# Patient Record
Sex: MALE | Race: WHITE | HISPANIC OR LATINO
[De-identification: names, ages, dates, MRNs, and addresses within clinical notes are randomized per-mention and may not be internally consistent; named-entity substitution may affect disease eponyms.]

---

## 2017-10-26 ENCOUNTER — RECORD ABSTRACTING (OUTPATIENT)
Age: 62
End: 2017-10-26

## 2017-10-26 ENCOUNTER — APPOINTMENT (OUTPATIENT)
Dept: CARDIOLOGY | Facility: CLINIC | Age: 62
End: 2017-10-26
Payer: COMMERCIAL

## 2017-10-26 VITALS — HEIGHT: 65 IN | BODY MASS INDEX: 30.99 KG/M2 | OXYGEN SATURATION: 98 % | HEART RATE: 71 BPM | WEIGHT: 186 LBS

## 2017-10-26 VITALS — DIASTOLIC BLOOD PRESSURE: 60 MMHG | SYSTOLIC BLOOD PRESSURE: 114 MMHG

## 2017-10-26 DIAGNOSIS — Z86.39 PERSONAL HISTORY OF OTHER ENDOCRINE, NUTRITIONAL AND METABOLIC DISEASE: ICD-10-CM

## 2017-10-26 DIAGNOSIS — Z86.79 PERSONAL HISTORY OF OTHER DISEASES OF THE CIRCULATORY SYSTEM: ICD-10-CM

## 2017-10-26 DIAGNOSIS — Z95.5 PRESENCE OF CORONARY ANGIOPLASTY IMPLANT AND GRAFT: ICD-10-CM

## 2017-10-26 DIAGNOSIS — R07.89 OTHER CHEST PAIN: ICD-10-CM

## 2017-10-26 DIAGNOSIS — I25.2 OLD MYOCARDIAL INFARCTION: ICD-10-CM

## 2017-10-26 DIAGNOSIS — Z87.891 PERSONAL HISTORY OF NICOTINE DEPENDENCE: ICD-10-CM

## 2017-10-26 PROCEDURE — 93000 ELECTROCARDIOGRAM COMPLETE: CPT

## 2017-10-26 PROCEDURE — 99204 OFFICE O/P NEW MOD 45 MIN: CPT | Mod: 25

## 2017-10-31 PROBLEM — Z95.5 HISTORY OF CORONARY ARTERY STENT PLACEMENT: Status: RESOLVED | Noted: 2017-10-26 | Resolved: 2017-10-31

## 2017-10-31 PROBLEM — Z87.891 FORMER SMOKER: Status: ACTIVE | Noted: 2017-10-26

## 2017-10-31 PROBLEM — I25.2 HISTORY OF HEART ATTACK: Status: RESOLVED | Noted: 2017-10-26 | Resolved: 2017-10-31

## 2017-10-31 PROBLEM — Z86.79 HISTORY OF HYPERTENSIVE HEART DISEASE: Status: RESOLVED | Noted: 2017-10-26 | Resolved: 2017-10-31

## 2017-10-31 PROBLEM — R07.89 ATYPICAL CHEST PAIN: Status: ACTIVE | Noted: 2017-10-26

## 2017-10-31 PROBLEM — Z86.39 HISTORY OF HYPERLIPIDEMIA: Status: RESOLVED | Noted: 2017-10-26 | Resolved: 2017-10-31

## 2017-10-31 PROBLEM — Z86.79 HISTORY OF HYPERTENSION: Status: RESOLVED | Noted: 2017-10-26 | Resolved: 2017-10-31

## 2017-10-31 PROBLEM — Z86.79 HISTORY OF CORONARY ARTERY DISEASE: Status: RESOLVED | Noted: 2017-10-26 | Resolved: 2017-10-31

## 2017-10-31 RX ORDER — ASPIRIN 81 MG/1
81 TABLET, CHEWABLE ORAL DAILY
Refills: 0 | Status: ACTIVE | COMMUNITY

## 2017-11-29 ENCOUNTER — APPOINTMENT (OUTPATIENT)
Dept: CARDIOLOGY | Facility: CLINIC | Age: 62
End: 2017-11-29

## 2017-12-05 ENCOUNTER — MEDICATION RENEWAL (OUTPATIENT)
Age: 62
End: 2017-12-05

## 2020-02-13 ENCOUNTER — APPOINTMENT (OUTPATIENT)
Dept: CARDIOLOGY | Facility: CLINIC | Age: 65
End: 2020-02-13

## 2021-06-28 DIAGNOSIS — M25.512 PAIN IN LEFT SHOULDER: ICD-10-CM

## 2021-06-28 DIAGNOSIS — Z83.3 FAMILY HISTORY OF DIABETES MELLITUS: ICD-10-CM

## 2021-06-28 DIAGNOSIS — R35.1 NOCTURIA: ICD-10-CM

## 2021-06-28 DIAGNOSIS — Z87.19 PERSONAL HISTORY OF OTHER DISEASES OF THE DIGESTIVE SYSTEM: ICD-10-CM

## 2021-06-28 RX ORDER — CHOLECALCIFEROL (VITAMIN D3) 1250 MCG
1.25 MG CAPSULE ORAL WEEKLY
Refills: 0 | Status: ACTIVE | COMMUNITY

## 2021-06-28 RX ORDER — LANCETS 33 GAUGE
EACH MISCELLANEOUS
Refills: 0 | Status: ACTIVE | COMMUNITY

## 2021-06-28 RX ORDER — BLOOD-GLUCOSE METER
W/DEVICE EACH MISCELLANEOUS
Refills: 0 | Status: ACTIVE | COMMUNITY

## 2021-07-12 ENCOUNTER — APPOINTMENT (OUTPATIENT)
Dept: INTERNAL MEDICINE | Facility: CLINIC | Age: 66
End: 2021-07-12
Payer: COMMERCIAL

## 2021-07-12 VITALS
OXYGEN SATURATION: 98 % | BODY MASS INDEX: 31.16 KG/M2 | WEIGHT: 187 LBS | RESPIRATION RATE: 18 BRPM | HEIGHT: 65 IN | HEART RATE: 83 BPM | TEMPERATURE: 96 F | SYSTOLIC BLOOD PRESSURE: 118 MMHG | DIASTOLIC BLOOD PRESSURE: 80 MMHG

## 2021-07-12 DIAGNOSIS — I25.2 OLD MYOCARDIAL INFARCTION: ICD-10-CM

## 2021-07-12 LAB
GLUCOSE BLDC GLUCOMTR-MCNC: 141
HBA1C MFR BLD HPLC: 6.5

## 2021-07-12 PROCEDURE — 99072 ADDL SUPL MATRL&STAF TM PHE: CPT

## 2021-07-12 PROCEDURE — 82962 GLUCOSE BLOOD TEST: CPT

## 2021-07-12 PROCEDURE — 83036 HEMOGLOBIN GLYCOSYLATED A1C: CPT | Mod: NC,QW

## 2021-07-12 PROCEDURE — 99214 OFFICE O/P EST MOD 30 MIN: CPT | Mod: 25

## 2021-07-13 NOTE — HISTORY OF PRESENT ILLNESS
[FreeTextEntry1] : Follow up, GHM, blood work, hx of diabetes, CAD, obesity, MI, stents, vit d def [de-identified] :  66 y/o M presents for f/u, GHM. Hx of diabetes, htn, CAD, Obesity, MI, stents and vit d def. \par He denies fever, cough, body aches, chills and SOB. Of note, received both doses of the pfizer vaccine.\par Pt is still working as a .\par \par Has not had eye eval thus far this year. \par Pt is here for [employer] and routine bood work.\par He denies chest pain/recent falls.\par Voices no further complaints.

## 2021-07-13 NOTE — COUNSELING
[Fall prevention counseling provided] : Fall prevention counseling provided [Adequate lighting] : Adequate lighting [No throw rugs] : No throw rugs [Use proper foot wear] : Use proper foot wear [Use recommended devices] : Use recommended devices [Behavioral health counseling provided] : Behavioral health counseling provided [Sleep ___ hours/day] : Sleep [unfilled] hours/day [Plan in advance] : Plan in advance [Potential consequences of obesity discussed] : Potential consequences of obesity discussed [Benefits of weight loss discussed] : Benefits of weight loss discussed [Structured Weight Management Program suggested:] : Structured weight management program suggested [Encouraged to maintain food diary] : Encouraged to maintain food diary [Encouraged to increase physical activity] : Encouraged to increase physical activity [Encouraged to use exercise tracking device] : Encouraged to use exercise tracking device [Weigh Self Weekly] : weigh self weekly [Keep Food Diary] : keep food diary [de-identified] : Discussed diabetic diet, told to cut simple carbs cut fats eat lean foods fish. [None] : None [Good understanding] : Patient has a good understanding of lifestyle changes and steps needed to achieve self management goal

## 2021-07-18 ENCOUNTER — RX RENEWAL (OUTPATIENT)
Age: 66
End: 2021-07-18

## 2021-07-27 ENCOUNTER — RX RENEWAL (OUTPATIENT)
Age: 66
End: 2021-07-27

## 2021-09-03 ENCOUNTER — RX RENEWAL (OUTPATIENT)
Age: 66
End: 2021-09-03

## 2021-12-13 ENCOUNTER — NON-APPOINTMENT (OUTPATIENT)
Age: 66
End: 2021-12-13

## 2021-12-13 ENCOUNTER — APPOINTMENT (OUTPATIENT)
Dept: INTERNAL MEDICINE | Facility: CLINIC | Age: 66
End: 2021-12-13
Payer: MEDICARE

## 2021-12-13 VITALS
TEMPERATURE: 97.8 F | HEART RATE: 84 BPM | WEIGHT: 187 LBS | RESPIRATION RATE: 17 BRPM | OXYGEN SATURATION: 98 % | DIASTOLIC BLOOD PRESSURE: 68 MMHG | BODY MASS INDEX: 31.16 KG/M2 | HEIGHT: 65 IN | SYSTOLIC BLOOD PRESSURE: 108 MMHG

## 2021-12-13 DIAGNOSIS — M54.50 LOW BACK PAIN, UNSPECIFIED: ICD-10-CM

## 2021-12-13 PROCEDURE — G0008: CPT

## 2021-12-13 PROCEDURE — G0447 BEHAVIOR COUNSEL OBESITY 15M: CPT

## 2021-12-13 PROCEDURE — G0442 ANNUAL ALCOHOL SCREEN 15 MIN: CPT

## 2021-12-13 PROCEDURE — 93000 ELECTROCARDIOGRAM COMPLETE: CPT | Mod: 59

## 2021-12-13 PROCEDURE — 99214 OFFICE O/P EST MOD 30 MIN: CPT | Mod: 25

## 2021-12-13 PROCEDURE — 90662 IIV NO PRSV INCREASED AG IM: CPT

## 2021-12-13 PROCEDURE — G0439: CPT

## 2021-12-13 PROCEDURE — G0444 DEPRESSION SCREEN ANNUAL: CPT | Mod: 59

## 2021-12-14 ENCOUNTER — NON-APPOINTMENT (OUTPATIENT)
Age: 66
End: 2021-12-14

## 2021-12-14 PROBLEM — M54.50 LOWER BACK PAIN: Status: ACTIVE | Noted: 2021-12-14

## 2021-12-14 LAB
25(OH)D3 SERPL-MCNC: 26.9 NG/ML
ALBUMIN SERPL ELPH-MCNC: 4.8 G/DL
ALP BLD-CCNC: 125 U/L
ALT SERPL-CCNC: 34 U/L
ANION GAP SERPL CALC-SCNC: 11 MMOL/L
APPEARANCE: CLEAR
AST SERPL-CCNC: 22 U/L
BASOPHILS # BLD AUTO: 0.06 K/UL
BASOPHILS NFR BLD AUTO: 0.9 %
BILIRUB SERPL-MCNC: 0.4 MG/DL
BILIRUBIN URINE: NEGATIVE
BLOOD URINE: NEGATIVE
BUN SERPL-MCNC: 22 MG/DL
CALCIUM SERPL-MCNC: 9.8 MG/DL
CHLORIDE SERPL-SCNC: 104 MMOL/L
CHOLEST SERPL-MCNC: 152 MG/DL
CO2 SERPL-SCNC: 23 MMOL/L
COLOR: NORMAL
CREAT SERPL-MCNC: 1.05 MG/DL
EOSINOPHIL # BLD AUTO: 0.15 K/UL
EOSINOPHIL NFR BLD AUTO: 2.3 %
ESTIMATED AVERAGE GLUCOSE: 148 MG/DL
GGT SERPL-CCNC: 35 U/L
GLUCOSE QUALITATIVE U: NEGATIVE
GLUCOSE SERPL-MCNC: 125 MG/DL
HBA1C MFR BLD HPLC: 6.8 %
HCT VFR BLD CALC: 45.2 %
HDLC SERPL-MCNC: 46 MG/DL
HGB BLD-MCNC: 14.6 G/DL
IMM GRANULOCYTES NFR BLD AUTO: 0.2 %
KETONES URINE: NEGATIVE
LDLC SERPL CALC-MCNC: 66 MG/DL
LEUKOCYTE ESTERASE URINE: NEGATIVE
LYMPHOCYTES # BLD AUTO: 2.15 K/UL
LYMPHOCYTES NFR BLD AUTO: 32.9 %
MAN DIFF?: NORMAL
MCHC RBC-ENTMCNC: 30.4 PG
MCHC RBC-ENTMCNC: 32.3 GM/DL
MCV RBC AUTO: 94.2 FL
MONOCYTES # BLD AUTO: 0.42 K/UL
MONOCYTES NFR BLD AUTO: 6.4 %
NEUTROPHILS # BLD AUTO: 3.74 K/UL
NEUTROPHILS NFR BLD AUTO: 57.3 %
NITRITE URINE: NEGATIVE
NONHDLC SERPL-MCNC: 105 MG/DL
PH URINE: 6
PLATELET # BLD AUTO: 174 K/UL
POTASSIUM SERPL-SCNC: 5.3 MMOL/L
PROT SERPL-MCNC: 7.1 G/DL
PROTEIN URINE: NEGATIVE
PSA SERPL-MCNC: 0.36 NG/ML
RBC # BLD: 4.8 M/UL
RBC # FLD: 12.8 %
SODIUM SERPL-SCNC: 138 MMOL/L
SPECIFIC GRAVITY URINE: 1.02
TRIGL SERPL-MCNC: 197 MG/DL
TSH SERPL-ACNC: 2.83 UIU/ML
UROBILINOGEN URINE: NORMAL
WBC # FLD AUTO: 6.53 K/UL

## 2021-12-14 NOTE — COUNSELING
[Fall prevention counseling provided] : Fall prevention counseling provided [Adequate lighting] : Adequate lighting [No throw rugs] : No throw rugs [Use proper foot wear] : Use proper foot wear [Use recommended devices] : Use recommended devices [Behavioral health counseling provided] : Behavioral health counseling provided [Sleep ___ hours/day] : Sleep [unfilled] hours/day [Engage in a relaxing activity] : Engage in a relaxing activity [Plan in advance] : Plan in advance [Potential consequences of obesity discussed] : Potential consequences of obesity discussed [Benefits of weight loss discussed] : Benefits of weight loss discussed [Structured Weight Management Program suggested:] : Structured weight management program suggested [Encouraged to maintain food diary] : Encouraged to maintain food diary [Encouraged to increase physical activity] : Encouraged to increase physical activity [Target Wt Loss Goal ___] : Weight Loss Goals: Target weight loss goal [unfilled] lbs [Weigh Self Weekly] : weigh self weekly [Decrease Portions] : decrease portions [____ min/wk Activity] : [unfilled] min/wk activity [None] : None [Good understanding] : Patient has a good understanding of lifestyle changes and steps needed to achieve self management goal [FreeTextEntry4] : 10 [de-identified] : Medical Annual wellness visit completed:\par HRA completed and reviewed with patient\par Medical, family, surgical history reviewed with patient and updated\par List of current providers r/w patient and updated\par Vitals, BMI reviewed and discussed along with healthy BMI goals. Dietary counseling x 15 minutes provided\par Depression PHQ 9 completed and reviewed \par Annual safety assessment reviewed\par discussed advanced directives\par smoking cessation counseling provided\par Established routine screening and immunization schedules\par \par VACCINATION & OTHER TX RECOMMENDATIONS\par \par ASA preventative therapy\par Calcium/Vitamin D supplementation\par  \par Dietary counseling, nutrition referral\par risks vs. benefits d/w patient. routine vaccination and vaccination schedules and recommendation d/w patient\par \par Vaccines recommended: \par * pneumovax (once after 65) & prevnar\par * annual Influenza vaccine\par * Hep B vaccines\par * zostavax\par * Tdap\par \par Colorectal screening recommended; screening colonoscopy q10yr, flex sig q5yr, annual fecal occult testing\par BMD recommended biennially for osteoporosis screening\par Glaucoma screening recommended, annual optho evals\par Cardiovascular screening and blood tests recommended and discussed w/ patient, cholesterol screening and dietary counseling\par AAA recommended x 1\par \par Met with SHARON CARMICHAEL, who was willing to discuss advance care planning. \par Our advance care planning conversation included a discussion about:\par 1. The value and importance of advance care planning.\par 2. Experiences with loved ones who have been seriously ill or have .\par 3. Exploration of personal, cultural, or spiritual beliefs that might influence medical decisions.\par 4. Exploration of goals of care in the event of a sudden injury or illness.\par 5. Identification of a health care agent.\par 6. Review and update, or completion of, an advance directive.\par Start time: ___1000am_________                    End time: ___1015am_________\par \par diet and exercise weight loss.  Low-salt low-fat ADA diet/ htn- Discussed diabetes physiology\par - Discussed importance of monitoring blood glucose levels\par - Encouraged a low fat/low cholesterol diet\par - Discussed symptoms of hyperglycemia and hypoglycemia\par - Discussed ADA glucose goals\par - Discussed  HGB A1c and the effects of blood glucose on the level\par - Discussed Healthy eating, avoidance of concentrated sweets, and to include vegetables by at least 2 meals a day\par - Discussed regular exercise\par - Discussed importance of follow up physician visits Limit intake of Sodium (Salt) to less than 2 grams a day to prevent fluid retention-swelling or worsening of symptoms. The importance of keeping the blood pressure at or below 130/80 to prevent stroke, heart attacks, kidney failure, blindness, and loss of limbs was  low chol diet. Avoid fried foods, red meat, butter, eggs, hard cheeses. Use canola or olive oil preferred. ::  was established in which goals would be set, monitoring would be done, and problem solving would also be addressed. The patient would be assisted using behavior change techniques, such as self-help and counseling through behavioral modification: Problem solving using hypnosis and positive medical reinforcement to achieve agreed-upon goals.\par \par Symptomatic patients : Test for influenza, if positive, treat for influenza and do not continue below. \par 1. Fever plus cough or shortness of breath : Test for RVP and COVID-19.\par 2.Indirect, circumstantial or unclear exposure to COVID-19, or other concerning cases not meeting above criteria: Please call [facility] to discuss testing. \par +++ All above cases must be reported to the [facility] registry. +++\par \par Asymptomatic patients: \par 1. Known first-degree direct-contact exposure to positive COVID-19 patient but asymptomatic: No testing PLUS 14 day self-quarantine. Pt to call if symptoms develop. [facility] to [facility].\par 2. No known exposure and asymptomatic, referred from outside healthcare organization: Please call [facility] to discuss testing. \par 3.All other asymptomatic patients with no known exposures: no testing, no exceptions.\par \par \par

## 2021-12-14 NOTE — REVIEW OF SYSTEMS
[Negative] : Heme/Lymph [Joint Pain] : joint pain [Back Pain] : back pain [FreeTextEntry9] : Lower back pain and left knee pain /left knee pain

## 2021-12-14 NOTE — HISTORY OF PRESENT ILLNESS
[FreeTextEntry1] : F/U,GHM and an Annual Wellness. Hx of diabetes,htn,CAD,obesity,MI,stents and vit d def. chief complaint today is low back pain and knee pain. [de-identified] : 67 y/o M presents for f/u, GHM.\par Hx of diabetes, htn, CAD, Obesity, MI, stents and vit d def. \par \par Patient is here for routine blood work and an EKG.\par Patient has been having left knee pain and lower back pain its gone now.\par Of note, received both doses of the Pfizer vaccine.\par Otherwise patient is stable. \par \par Voices no further complaints.\par ROS as documented below.\par Denies fever,cough,chills,body aches and SOB.

## 2021-12-14 NOTE — HEALTH RISK ASSESSMENT
[Former] : Former [1 or 2 (0 pts)] : 1 or 2 (0 points) [Never (0 pts)] : Never (0 points) [No] : In the past 12 months have you used drugs other than those required for medical reasons? No [No falls in past year] : Patient reported no falls in the past year [0] : 2) Feeling down, depressed, or hopeless: Not at all (0) [PHQ-2 Negative - No further assessment needed] : PHQ-2 Negative - No further assessment needed [I have developed a follow-up plan documented below in the note.] : I have developed a follow-up plan documented below in the note. [None] : None [Feels Safe at Home] : Feels safe at home [Fully functional (bathing, dressing, toileting, transferring, walking, feeding)] : Fully functional (bathing, dressing, toileting, transferring, walking, feeding) [Fully functional (using the telephone, shopping, preparing meals, housekeeping, doing laundry, using] : Fully functional and needs no help or supervision to perform IADLs (using the telephone, shopping, preparing meals, housekeeping, doing laundry, using transportation, managing medications and managing finances) [Fair] :  ~his/her~ mood as fair [With Significant Other] : lives with significant other [Employed] : employed [] :  [Sexually Active] : sexually active [Carbon Monoxide Detector] : carbon monoxide detector [Safety elements used in home] : safety elements used in home [Seat Belt] :  uses seat belt [Sunscreen] : uses sunscreen [Reviewed no changes] : Reviewed, no changes [Designated Healthcare Proxy] : Designated healthcare proxy [Relationship: ___] : Relationship: [unfilled] [Aggressive treatment] : aggressive treatment [Last Verification Date: ___] : Last Verification Date: [unfilled] [Time Spent: ___ minutes] : Time Spent: [unfilled] minutes [Yes] : Yes [2 - 4 times a month (2 pts)] : 2-4 times a month (2 points) [YearQuit] : 1990 [Audit-CScore] : 0 [de-identified] : Light [de-identified] : Standard [CAD2Folxg] : 0 [Change in mental status noted] : No change in mental status noted [Language] : denies difficulty with language [Behavior] : denies difficulty with behavior [Learning/Retaining New Information] : denies difficulty learning/retaining new information [Handling Complex Tasks] : denies difficulty handling complex tasks [Reasoning] : denies difficulty with reasoning [Spatial Ability and Orientation] : denies difficulty with spatial ability and orientation [High Risk Behavior] : no high risk behavior [Reports changes in hearing] : Reports no changes in hearing [Reports changes in vision] : Reports no changes in vision [Reports normal functional visual acuity (ie: able to read med bottle)] : Reports poor functional visual acuity.  [Reports changes in dental health] : Reports no changes in dental health [Guns at Home] : no guns at home [Travel to Developing Areas] : does not  travel to developing areas [BoneDensityComments] : NOT UTD  [ColonoscopyComments] : NOT UTD [FreeTextEntry2] :

## 2022-01-05 ENCOUNTER — APPOINTMENT (OUTPATIENT)
Dept: INTERNAL MEDICINE | Facility: CLINIC | Age: 67
End: 2022-01-05
Payer: MEDICARE

## 2022-01-05 VITALS
OXYGEN SATURATION: 99 % | WEIGHT: 189 LBS | TEMPERATURE: 95.8 F | DIASTOLIC BLOOD PRESSURE: 76 MMHG | SYSTOLIC BLOOD PRESSURE: 126 MMHG | RESPIRATION RATE: 17 BRPM | HEART RATE: 86 BPM | HEIGHT: 65 IN | BODY MASS INDEX: 31.49 KG/M2

## 2022-01-05 DIAGNOSIS — Z20.822 CONTACT WITH AND (SUSPECTED) EXPOSURE TO COVID-19: ICD-10-CM

## 2022-01-05 PROCEDURE — 99214 OFFICE O/P EST MOD 30 MIN: CPT

## 2022-01-06 PROBLEM — Z20.822 ENCOUNTER FOR LABORATORY TESTING FOR COVID-19 VIRUS: Status: ACTIVE | Noted: 2022-01-06

## 2022-01-06 PROBLEM — Z20.822 CLOSE EXPOSURE TO COVID-19 VIRUS: Status: ACTIVE | Noted: 2022-01-06

## 2022-01-06 NOTE — ADDENDUM
[FreeTextEntry1] : Symptomatic patients : Test for influenza, if positive, treat for influenza and do not continue below. \par 1. Fever plus cough or shortness of breath : Test for RVP and COVID-19.\par 2.Indirect, circumstantial or unclear exposure to COVID-19, or other concerning cases not meeting above criteria: Please call [facility] to discuss testing. \par +++ All above cases must be reported to the [facility] registry. +++\par \par Asymptomatic patients: \par 1. Known first-degree direct-contact exposure to positive COVID-19 patient but asymptomatic: No testing PLUS 14 day self-quarantine. Pt to call if symptoms develop. Report to [facility] Registry.\par 2. No known exposure and asymptomatic, referred from outside healthcare organization: Please call [facility] to discuss testing. \par 3.All other asymptomatic patients with no known exposures: no testing, no exceptions.\par \par - Discussed diabetes physiology\par - Discussed importance of monitoring blood glucose levels\par - Encouraged a low fat/low cholesterol diet\par - Discussed symptoms of hyperglycemia and hypoglycemia\par - Discussed ADA glucose goals\par - Discussed  HGB A1c and the effects of blood glucose on the level\par - Discussed Healthy eating, avoidance of concentrated sweets, and to include vegetables by at least 2 meals a day\par - Discussed regular exercise\par - Discussed importance of follow up physician visits\par \par \par \par Bp stable, continue with medications, dash diet, exercise, and dietary management.Continue to check home Bp’s.\par \par diet and exercise weight loss.  Low-salt low-fat ADA diet/ htn- Discussed diabetes physiology\par - Discussed importance of monitoring blood glucose levels\par - Encouraged a low fat/low cholesterol diet\par - Discussed symptoms of hyperglycemia and hypoglycemia\par - Discussed ADA glucose goals\par - Discussed  HGB A1c and the effects of blood glucose on the level\par - Discussed Healthy eating, avoidance of concentrated sweets, and to include vegetables by at least 2 meals a day\par - Discussed regular exercise\par - Discussed importance of follow up physician visits Limit intake of Sodium (Salt) to less than 2 grams a day to prevent fluid retention-swelling or worsening of symptoms. The importance of keeping the blood pressure at or below 130/80 to prevent stroke, heart attacks, kidney failure, blindness, and loss of limbs was  low chol diet. Avoid fried foods, red meat, butter, eggs, hard cheeses. Use canola or olive oil preferred. ::  was established in which goals would be set, monitoring would be done, and problem solving would also be addressed. The patient would be assisted using behavior change techniques, such as self-help and counseling through behavioral modification: Problem solving using hypnosis and positive medical reinforcement to achieve agreed-upon goals.\par

## 2022-01-06 NOTE — HISTORY OF PRESENT ILLNESS
[Patient presents to the office today for COVID-19 evaluation and testing.] : Patient presents to the office today for COVID-19 evaluation and testing. [] : no dizziness on standing [With Suspected Case] : patient exposed to a suspected case of COVID-19 [Age >= 60 years] : age >= 60 years [None] : none [Clear] : clear [Good Air Entry] : good air entry [Normal O2 sat at rest] : normal O2 sat at rest [Grossly normal, interacts, not tired or weak] : grossly normal, interacts, not tired or weak [COVID-19 testing ordered and specimen obtained] : COVID-19 testing ordered and specimen obtained [FreeTextEntry1] : Patient developed nasal congestion cough and sore throat with muscle aches.  General health maintenance for CAD diabetes hypertension

## 2022-01-07 ENCOUNTER — NON-APPOINTMENT (OUTPATIENT)
Age: 67
End: 2022-01-07

## 2022-01-07 LAB
RAPID RVP RESULT: DETECTED
SARS-COV-2 RNA PNL RESP NAA+PROBE: DETECTED

## 2022-01-07 NOTE — HISTORY OF PRESENT ILLNESS
[Home] : at home, [unfilled] , at the time of the visit. [FreeTextEntry1] : Patient made aware that he is COVID positive= patient states that his symptoms of head cold are resolving patient will quarantine for 10 days.\par \par Symptomatic patients : Test for influenza, if positive, treat for influenza and do not continue below. \par 1. Fever plus cough or shortness of breath : Test for RVP and COVID-19.\par 2.Indirect, circumstantial or unclear exposure to COVID-19, or other concerning cases not meeting above criteria: Please call [facility] to discuss testing. \par +++ All above cases must be reported to the [facility] registry. +++\par \par Asymptomatic patients: \par 1. Known first-degree direct-contact exposure to positive COVID-19 patient but asymptomatic: No testing PLUS 14 day self-quarantine. Pt to call if symptoms develop. Report to [facility] Registry.\par 2. No known exposure and asymptomatic, referred from outside healthcare organization: Please call [facility] to discuss testing. \par 3.All other asymptomatic patients with no known exposures: no testing, no exceptions.\par \par

## 2022-02-14 ENCOUNTER — NON-APPOINTMENT (OUTPATIENT)
Age: 67
End: 2022-02-14

## 2022-02-14 ENCOUNTER — APPOINTMENT (OUTPATIENT)
Dept: CARDIOLOGY | Facility: CLINIC | Age: 67
End: 2022-02-14
Payer: MEDICARE

## 2022-02-14 VITALS
TEMPERATURE: 97 F | OXYGEN SATURATION: 98 % | HEIGHT: 65 IN | HEART RATE: 76 BPM | BODY MASS INDEX: 31.65 KG/M2 | WEIGHT: 190 LBS

## 2022-02-14 VITALS — SYSTOLIC BLOOD PRESSURE: 116 MMHG | DIASTOLIC BLOOD PRESSURE: 70 MMHG

## 2022-02-14 PROCEDURE — 93000 ELECTROCARDIOGRAM COMPLETE: CPT

## 2022-02-14 PROCEDURE — 99205 OFFICE O/P NEW HI 60 MIN: CPT

## 2022-02-14 RX ORDER — ZOLPIDEM TARTRATE 5 MG/1
5 TABLET ORAL
Qty: 30 | Refills: 2 | Status: DISCONTINUED | COMMUNITY
End: 2022-02-14

## 2022-02-14 RX ORDER — DICLOFENAC SODIUM 1% 10 MG/G
1 GEL TOPICAL
Qty: 100 | Refills: 3 | Status: DISCONTINUED | COMMUNITY
Start: 2021-12-13 | End: 2022-02-14

## 2022-02-14 RX ORDER — FOSINOPRIL SODIUM 10 MG/1
10 TABLET ORAL DAILY
Refills: 0 | Status: DISCONTINUED | COMMUNITY
End: 2022-02-14

## 2022-02-14 RX ORDER — BENZONATATE 100 MG/1
100 CAPSULE ORAL EVERY 6 HOURS
Qty: 30 | Refills: 0 | Status: DISCONTINUED | COMMUNITY
Start: 2022-01-05 | End: 2022-02-14

## 2022-02-14 RX ORDER — DICLOFENAC SODIUM 10 MG/G
1 GEL TOPICAL
Refills: 0 | Status: DISCONTINUED | COMMUNITY
End: 2022-02-14

## 2022-02-14 RX ORDER — AZITHROMYCIN 250 MG/1
250 TABLET, FILM COATED ORAL
Qty: 1 | Refills: 0 | Status: DISCONTINUED | COMMUNITY
Start: 2022-01-05 | End: 2022-02-14

## 2022-02-14 NOTE — HISTORY OF PRESENT ILLNESS
[FreeTextEntry1] : 56-year-old gentleman came back for cardiac evaluation.  I used to see him regularly until 2012 when he was misinformed that I was not accepting his insurance and therefore he did not seeme or  any other cardiologist for 5 years.  He came back in 2017 and at that time he was having occasional chest pain.  A stress test was ordered which showed minimal reversible defect in the inferior wall which was felt to be more likely to be artifactual rather than true ischemia.  He was also supposed to get an echocardiogram and come back for follow-up.  He never came back.  He does not remember why.\par \par He states that he has been taking all the medications.  He has not had any chest pain or palpitation or shortness of breath or dizziness.\par \par He tries to exercise by walking about 45 minutes.  He starts walking slowly and then picks up the pace.  He has no symptoms while walking.  He lives on second-floor and  sometimes when he walks up to the second floor, he feels tired.\par \par He used to drink alcohol, smoke cigarettes and pot.  He gave up all that in 1990.

## 2022-02-14 NOTE — ASSESSMENT
[FreeTextEntry1] : He is doing well from cardiac point of view.  Blood pressure is well controlled.  He has no significant symptoms on physical exertion.\par \par Blood tests in December 2021 showed normal CBC, PSA, TSH.  Vitamin D level was low and 26.9.  He was started on supplementation at that time.  CMP showed blood sugar 125 and alk phos slightly elevated 125.  Estimated GFR was 73.  Triglycerides were 197, total cholesterol 152, HDL 46, LDL 66 and non-.  Gamma GT was normal.  Hemoglobin was 6.8 with estimated average glucose of 148.  Urine analysis was negative.

## 2022-02-14 NOTE — DISCUSSION/SUMMARY
[FreeTextEntry1] : I reconciled his medication list.  I told him to continue on current medications.  He needs to get vitamin D level repeated.  He should have an echocardiogram to see global ejection fraction, LV size and regional wall motion.  He has history of coronary disease with MI in 1997 when he had 1 stent.  A second stent was put  6 months later for chest pain.  \par \par Based on the results further recommendations will follow.

## 2022-02-28 ENCOUNTER — APPOINTMENT (OUTPATIENT)
Dept: CARDIOLOGY | Facility: CLINIC | Age: 67
End: 2022-02-28
Payer: MEDICARE

## 2022-02-28 PROCEDURE — 93306 TTE W/DOPPLER COMPLETE: CPT

## 2022-03-29 ENCOUNTER — RX RENEWAL (OUTPATIENT)
Age: 67
End: 2022-03-29

## 2022-04-25 ENCOUNTER — RESULT CHARGE (OUTPATIENT)
Age: 67
End: 2022-04-25

## 2022-04-25 ENCOUNTER — APPOINTMENT (OUTPATIENT)
Dept: INTERNAL MEDICINE | Facility: CLINIC | Age: 67
End: 2022-04-25
Payer: MEDICARE

## 2022-04-25 VITALS
HEART RATE: 67 BPM | OXYGEN SATURATION: 97 % | BODY MASS INDEX: 32.99 KG/M2 | HEIGHT: 65 IN | WEIGHT: 198 LBS | SYSTOLIC BLOOD PRESSURE: 126 MMHG | RESPIRATION RATE: 18 BRPM | DIASTOLIC BLOOD PRESSURE: 72 MMHG | TEMPERATURE: 98.7 F

## 2022-04-25 LAB
GLUCOSE BLDC GLUCOMTR-MCNC: 140
HBA1C MFR BLD HPLC: 7.1

## 2022-04-25 PROCEDURE — 82962 GLUCOSE BLOOD TEST: CPT

## 2022-04-25 PROCEDURE — 99214 OFFICE O/P EST MOD 30 MIN: CPT | Mod: 25

## 2022-04-25 PROCEDURE — 83036 HEMOGLOBIN GLYCOSYLATED A1C: CPT | Mod: QW

## 2022-04-25 PROCEDURE — 99401 PREV MED CNSL INDIV APPRX 15: CPT | Mod: GY

## 2022-04-25 PROCEDURE — 36416 COLLJ CAPILLARY BLOOD SPEC: CPT

## 2022-04-26 NOTE — HISTORY OF PRESENT ILLNESS
[FreeTextEntry1] : F/U,GHM. Hx of diabetes,htn,CAD,obesity,MI,stents and vit d def. Patient is here today complaining of having left knee pain for about two months.  [de-identified] : 67 y/o M presents for f/u, GHM.\par Hx of diabetes, htn, CAD, Obesity, MI, stents and vit d def. \par \par Patient is here today complaining of having left knee pain on and off for about two months already.\par He has been applying Diclofenac Sodium 1% external cream on his knee and states that it has been helping him. \par Patient also needs a refill on his medication (Ambien 10mg).\par Of note, received both doses of the Pfizer vaccine.\par Otherwise patient is stable. \par \par Voices no further complaints.\par ROS as documented below.\par Denies fever,cough,chills,body aches and SOB.

## 2022-04-26 NOTE — HEALTH RISK ASSESSMENT
[Good] : ~his/her~  mood as  good [Never] : Never [Yes] : Yes [No falls in past year] : Patient reported no falls in the past year [0] : 2) Feeling down, depressed, or hopeless: Not at all (0) [None] : None [With Significant Other] : lives with significant other [Employed] : employed [] :  [# Of Children ___] : has [unfilled] children [Sexually Active] : sexually active [Reviewed no changes] : Reviewed, no changes [Change in mental status noted] : No change in mental status noted [Language] : denies difficulty with language [Behavior] : denies difficulty with behavior [Learning/Retaining New Information] : denies difficulty learning/retaining new information [Reasoning] : denies difficulty with reasoning [Spatial Ability and Orientation] : denies difficulty with spatial ability and orientation

## 2022-04-26 NOTE — PHYSICAL EXAM
[No Acute Distress] : no acute distress [Well Nourished] : well nourished [Well Developed] : well developed [Well-Appearing] : well-appearing [Normal Sclera/Conjunctiva] : normal sclera/conjunctiva [PERRL] : pupils equal round and reactive to light [EOMI] : extraocular movements intact [Normal Outer Ear/Nose] : the outer ears and nose were normal in appearance [Normal Oropharynx] : the oropharynx was normal [No JVD] : no jugular venous distention [No Lymphadenopathy] : no lymphadenopathy [Supple] : supple [Thyroid Normal, No Nodules] : the thyroid was normal and there were no nodules present [No Respiratory Distress] : no respiratory distress  [No Accessory Muscle Use] : no accessory muscle use [Clear to Auscultation] : lungs were clear to auscultation bilaterally [Normal Rate] : normal rate  [Regular Rhythm] : with a regular rhythm [Normal S1, S2] : normal S1 and S2 [No Murmur] : no murmur heard [No Carotid Bruits] : no carotid bruits [No Abdominal Bruit] : a ~M bruit was not heard ~T in the abdomen [No Varicosities] : no varicosities [Pedal Pulses Present] : the pedal pulses are present [No Edema] : there was no peripheral edema [No Palpable Aorta] : no palpable aorta [No Extremity Clubbing/Cyanosis] : no extremity clubbing/cyanosis [Soft] : abdomen soft [Non Tender] : non-tender [Non-distended] : non-distended [No Masses] : no abdominal mass palpated [No HSM] : no HSM [Normal Bowel Sounds] : normal bowel sounds [Normal Posterior Cervical Nodes] : no posterior cervical lymphadenopathy [Normal Anterior Cervical Nodes] : no anterior cervical lymphadenopathy [No CVA Tenderness] : no CVA  tenderness [No Spinal Tenderness] : no spinal tenderness [No Joint Swelling] : no joint swelling [Grossly Normal Strength/Tone] : grossly normal strength/tone [No Rash] : no rash [Coordination Grossly Intact] : coordination grossly intact [No Focal Deficits] : no focal deficits [Normal Gait] : normal gait [Deep Tendon Reflexes (DTR)] : deep tendon reflexes were 2+ and symmetric [Normal Affect] : the affect was normal [Normal Insight/Judgement] : insight and judgment were intact [de-identified] : Swelling decreased range of motion crepitations left knee tenderness to palpation of medial line.

## 2022-04-26 NOTE — COUNSELING
[Fall prevention counseling provided] : Fall prevention counseling provided [Adequate lighting] : Adequate lighting [No throw rugs] : No throw rugs [Use proper foot wear] : Use proper foot wear [Use recommended devices] : Use recommended devices [de-identified] : diet and exercise weight loss.  Low-salt low-fat ADA diet/ htn- Discussed diabetes physiology\par - Discussed importance of monitoring blood glucose levels\par - Encouraged a low fat/low cholesterol diet\par - Discussed symptoms of hyperglycemia and hypoglycemia\par - Discussed ADA glucose goals\par - Discussed  HGB A1c and the effects of blood glucose on the level\par - Discussed Healthy eating, avoidance of concentrated sweets, and to include vegetables by at least 2 meals a day\par - Discussed regular exercise\par - Discussed importance of follow up physician visits Limit intake of Sodium (Salt) to less than 2 grams a day to prevent fluid retention-swelling or worsening of symptoms. The importance of keeping the blood pressure at or below 130/80 to prevent stroke, heart attacks, kidney failure, blindness, and loss of limbs was  low chol diet. Avoid fried foods, red meat, butter, eggs, hard cheeses. Use canola or olive oil preferred. ::  was established in which goals would be set, monitoring would be done, and problem solving would also be addressed. The patient would be assisted using behavior change techniques, such as self-help and counseling through behavioral modification: Problem solving using hypnosis and positive medical reinforcement to achieve agreed-upon goals.\par - Discussed diabetes physiology\par - Discussed importance of monitoring blood glucose levels\par - Encouraged a low fat/low cholesterol diet\par - Discussed symptoms of hyperglycemia and hypoglycemia\par - Discussed ADA glucose goals\par - Discussed  HGB A1c and the effects of blood glucose on the level\par - Discussed Healthy eating, avoidance of concentrated sweets, and to include vegetables by at least 2 meals a day\par - Discussed regular exercise\par - Discussed importance of follow up physician visits\par \par \par \par Bp stable, continue with medications, dash diet, exercise, and dietary management.Continue to check home Bp’s.\par take medications as advised. Rest, ice/heat massage therapy/myofascial  release no heavy lift or twisting avoid prolonged positioning. We reviewed proper lifting mechanics continue with a home stretching program. Return to office in 2 weeks\par \par Symptomatic patients : Test for influenza, if positive, treat for influenza and do not continue below. \par 1. Fever plus cough or shortness of breath : Test for RVP and COVID-19.\par 2.Indirect, circumstantial or unclear exposure to COVID-19, or other concerning cases not meeting above criteria: Please call [facility] to discuss testing. \par +++ All above cases must be reported to the [facility] registry. +++\par \par Asymptomatic patients: \par 1. Known first-degree direct-contact exposure to positive COVID-19 patient but asymptomatic: No testing PLUS 14 day self-quarantine. Pt to call if symptoms develop. Report to [facility] Registry.\par 2. No known exposure and asymptomatic, referred from outside healthcare organization: Please call [facility] to discuss testing. \par 3.All other asymptomatic patients with no known exposures: no testing, no exceptions.\par Face-to-face counseling 10 minutes\par

## 2022-04-26 NOTE — REVIEW OF SYSTEMS
[Negative] : Heme/Lymph [Joint Pain] : joint pain [Joint Stiffness] : joint stiffness [FreeTextEntry9] : Left knee pain

## 2022-05-13 ENCOUNTER — RX RENEWAL (OUTPATIENT)
Age: 67
End: 2022-05-13

## 2022-05-16 ENCOUNTER — APPOINTMENT (OUTPATIENT)
Dept: CARDIOLOGY | Facility: CLINIC | Age: 67
End: 2022-05-16

## 2022-06-11 ENCOUNTER — RX RENEWAL (OUTPATIENT)
Age: 67
End: 2022-06-11

## 2022-07-12 ENCOUNTER — RX RENEWAL (OUTPATIENT)
Age: 67
End: 2022-07-12

## 2022-08-01 ENCOUNTER — APPOINTMENT (OUTPATIENT)
Dept: INTERNAL MEDICINE | Facility: CLINIC | Age: 67
End: 2022-08-01

## 2022-08-01 VITALS
DIASTOLIC BLOOD PRESSURE: 72 MMHG | RESPIRATION RATE: 18 BRPM | TEMPERATURE: 97.3 F | WEIGHT: 187 LBS | HEIGHT: 65 IN | HEART RATE: 87 BPM | SYSTOLIC BLOOD PRESSURE: 110 MMHG | OXYGEN SATURATION: 96 % | BODY MASS INDEX: 31.16 KG/M2

## 2022-08-01 DIAGNOSIS — G47.00 INSOMNIA, UNSPECIFIED: ICD-10-CM

## 2022-08-01 LAB
GLUCOSE BLDC GLUCOMTR-MCNC: 133
HBA1C MFR BLD HPLC: 7

## 2022-08-01 PROCEDURE — 82962 GLUCOSE BLOOD TEST: CPT

## 2022-08-01 PROCEDURE — 83036 HEMOGLOBIN GLYCOSYLATED A1C: CPT | Mod: QW

## 2022-08-01 PROCEDURE — 99401 PREV MED CNSL INDIV APPRX 15: CPT

## 2022-08-01 PROCEDURE — 99214 OFFICE O/P EST MOD 30 MIN: CPT | Mod: 25

## 2022-08-01 RX ORDER — ALCOHOL ANTISEPTIC PADS
PADS, MEDICATED (EA) TOPICAL
Qty: 100 | Refills: 2 | Status: ACTIVE | COMMUNITY
Start: 2021-12-13 | End: 1900-01-01

## 2022-08-01 RX ORDER — BLOOD SUGAR DIAGNOSTIC
STRIP MISCELLANEOUS
Qty: 100 | Refills: 3 | Status: ACTIVE | COMMUNITY
Start: 2021-12-13 | End: 1900-01-01

## 2022-08-01 RX ORDER — BLOOD SUGAR DIAGNOSTIC
STRIP MISCELLANEOUS
Qty: 1 | Refills: 3 | Status: ACTIVE | COMMUNITY
Start: 2021-07-18 | End: 1900-01-01

## 2022-08-02 NOTE — COUNSELING
[Fall prevention counseling provided] : Fall prevention counseling provided [Adequate lighting] : Adequate lighting [No throw rugs] : No throw rugs [Use proper foot wear] : Use proper foot wear [Behavioral health counseling provided] : Behavioral health counseling provided [Sleep ___ hours/day] : Sleep [unfilled] hours/day [Engage in a relaxing activity] : Engage in a relaxing activity [Plan in advance] : Plan in advance [None] : None [Good understanding] : Patient has a good understanding of lifestyle changes and steps needed to achieve self management goal [de-identified] : Symptomatic patients : Test for influenza, if positive, treat for influenza and do not continue below. \par 1. Fever plus cough or shortness of breath : Test for RVP and COVID-19.\par 2.Indirect, circumstantial or unclear exposure to COVID-19, or other concerning cases not meeting above criteria: Please call [facility] to discuss testing. \par +++ All above cases must be reported to the [facility] registry. +++\par \par Asymptomatic patients: \par 1. Known first-degree direct-contact exposure to positive COVID-19 patient but asymptomatic: No testing PLUS 14 day self-quarantine. Pt to call if symptoms develop. Report to [facility] Registry.\par 2. No known exposure and asymptomatic, referred from outside healthcare organization: Please call [facility] to discuss testing. \par 3.All other asymptomatic patients with no known exposures: no testing, no exceptions.\par \par diet and exercise weight loss.  Low-salt low-fat ADA diet/ htn- Discussed diabetes physiology\par - Discussed importance of monitoring blood glucose levels\par - Encouraged a low fat/low cholesterol diet\par - Discussed symptoms of hyperglycemia and hypoglycemia\par - Discussed ADA glucose goals\par - Discussed  HGB A1c and the effects of blood glucose on the level\par - Discussed Healthy eating, avoidance of concentrated sweets, and to include vegetables by at least 2 meals a day\par - Discussed regular exercise\par - Discussed importance of follow up physician visits Limit intake of Sodium (Salt) to less than 2 grams a day to prevent fluid retention-swelling or worsening of symptoms. The importance of keeping the blood pressure at or below 130/80 to prevent stroke, heart attacks, kidney failure, blindness, and loss of limbs was  low chol diet. Avoid fried foods, red meat, butter, eggs, hard cheeses. Use canola or olive oil preferred. ::  was established in which goals would be set, monitoring would be done, and problem solving would also be addressed. The patient would be assisted using behavior change techniques, such as self-help and counseling through behavioral modification: Problem solving using hypnosis and positive medical reinforcement to achieve agreed-upon goals.\par low chol diet. Avoid fried foods, red meat, butter, eggs, hard cheeses. Use canola or olive oil preferred.\par \par  - Encouraged a low fat/low cholesterol diet\par  - Discussed Healthy eating, avoidance of concentrated sweets, and to include vegetables by at least 3 meals a day\par  - encouraged low glycemic fruits, grains and vegetables and a diet high in plant protein\par  - Discussed regular exercise\par  - Discussed importance of follow up physician visits\par \par - Discussed diabetes physiology\par - Discussed importance of monitoring blood glucose levels\par - Encouraged a low fat/low cholesterol diet\par - Discussed symptoms of hyperglycemia and hypoglycemia\par - Discussed ADA glucose goals\par - Discussed  HGB A1c and the effects of blood glucose on the level\par - Discussed Healthy eating, avoidance of concentrated sweets, and to include vegetables by at least 2 meals a day\par - Discussed regular exercise\par - Discussed importance of follow up physician visits\par \par Face-to-face counseling 15 minutes\par \par

## 2022-08-02 NOTE — PHYSICAL EXAM
[No Acute Distress] : no acute distress [Well Nourished] : well nourished [Well Developed] : well developed [Well-Appearing] : well-appearing [Normal Sclera/Conjunctiva] : normal sclera/conjunctiva [PERRL] : pupils equal round and reactive to light [EOMI] : extraocular movements intact [Normal Outer Ear/Nose] : the outer ears and nose were normal in appearance [Normal Oropharynx] : the oropharynx was normal [No JVD] : no jugular venous distention [No Lymphadenopathy] : no lymphadenopathy [Supple] : supple [Thyroid Normal, No Nodules] : the thyroid was normal and there were no nodules present [No Respiratory Distress] : no respiratory distress  [No Accessory Muscle Use] : no accessory muscle use [Clear to Auscultation] : lungs were clear to auscultation bilaterally [Normal Rate] : normal rate  [Regular Rhythm] : with a regular rhythm [Normal S1, S2] : normal S1 and S2 [No Murmur] : no murmur heard [No Carotid Bruits] : no carotid bruits [No Abdominal Bruit] : a ~M bruit was not heard ~T in the abdomen [No Varicosities] : no varicosities [Pedal Pulses Present] : the pedal pulses are present [No Edema] : there was no peripheral edema [No Palpable Aorta] : no palpable aorta [No Extremity Clubbing/Cyanosis] : no extremity clubbing/cyanosis [Soft] : abdomen soft [Non Tender] : non-tender [Non-distended] : non-distended [No Masses] : no abdominal mass palpated [No HSM] : no HSM [Normal Bowel Sounds] : normal bowel sounds [Normal Posterior Cervical Nodes] : no posterior cervical lymphadenopathy [Normal Anterior Cervical Nodes] : no anterior cervical lymphadenopathy [No CVA Tenderness] : no CVA  tenderness [No Spinal Tenderness] : no spinal tenderness [Grossly Normal Strength/Tone] : grossly normal strength/tone [No Rash] : no rash [Coordination Grossly Intact] : coordination grossly intact [No Focal Deficits] : no focal deficits [Normal Gait] : normal gait [Deep Tendon Reflexes (DTR)] : deep tendon reflexes were 2+ and symmetric [Normal Affect] : the affect was normal [Normal Insight/Judgement] : insight and judgment were intact [de-identified] : Bilateral knee tenderness decreased range of motion

## 2022-08-02 NOTE — HISTORY OF PRESENT ILLNESS
[FreeTextEntry1] : F/U,[facility]. Hx of diabetes,htn,CAD,obesity,MI,stents and vit d def, L knee pain.  Patient comes for diabetic management. [de-identified] : 65 y/o M presents for f/u, [facility].\par Hx of diabetes, htn, CAD, Obesity, MI, stents and vit d def. \par \par Hx of left knee pain on and off.\par He has been applying Diclofenac Sodium 1% external cream on his knee and states that it has been helping him. \par Ambien for hx of insomnia\par Of note, received both doses of the Pfizer vaccine.\par Otherwise patient is stable. \par \par Voices no further complaints.\par ROS as documented below.\par Denies fever,cough,chills,body aches and SOB.

## 2022-09-11 ENCOUNTER — RX RENEWAL (OUTPATIENT)
Age: 67
End: 2022-09-11

## 2022-11-03 ENCOUNTER — RX RENEWAL (OUTPATIENT)
Age: 67
End: 2022-11-03

## 2022-11-08 ENCOUNTER — RX RENEWAL (OUTPATIENT)
Age: 67
End: 2022-11-08

## 2022-11-14 ENCOUNTER — APPOINTMENT (OUTPATIENT)
Dept: INTERNAL MEDICINE | Facility: CLINIC | Age: 67
End: 2022-11-14

## 2022-11-14 VITALS
RESPIRATION RATE: 18 BRPM | SYSTOLIC BLOOD PRESSURE: 132 MMHG | HEIGHT: 65 IN | HEART RATE: 88 BPM | TEMPERATURE: 97.4 F | BODY MASS INDEX: 29.99 KG/M2 | WEIGHT: 180 LBS | DIASTOLIC BLOOD PRESSURE: 80 MMHG | OXYGEN SATURATION: 98 %

## 2022-11-14 DIAGNOSIS — E66.3 OVERWEIGHT: ICD-10-CM

## 2022-11-14 DIAGNOSIS — R21 RASH AND OTHER NONSPECIFIC SKIN ERUPTION: ICD-10-CM

## 2022-11-14 PROCEDURE — G0008: CPT

## 2022-11-14 PROCEDURE — 36415 COLL VENOUS BLD VENIPUNCTURE: CPT

## 2022-11-14 PROCEDURE — 99214 OFFICE O/P EST MOD 30 MIN: CPT | Mod: 25

## 2022-11-14 PROCEDURE — 90662 IIV NO PRSV INCREASED AG IM: CPT

## 2022-11-14 RX ORDER — SILDENAFIL 20 MG/1
20 TABLET ORAL
Qty: 30 | Refills: 0 | Status: ACTIVE | COMMUNITY
Start: 2022-11-14 | End: 1900-01-01

## 2022-11-14 RX ORDER — VALACYCLOVIR 1 G/1
1 TABLET, FILM COATED ORAL
Qty: 15 | Refills: 0 | Status: ACTIVE | COMMUNITY
Start: 2022-11-14 | End: 1900-01-01

## 2022-11-15 ENCOUNTER — NON-APPOINTMENT (OUTPATIENT)
Age: 67
End: 2022-11-15

## 2022-11-15 LAB
25(OH)D3 SERPL-MCNC: 29.5 NG/ML
ALBUMIN SERPL ELPH-MCNC: 4.6 G/DL
ALP BLD-CCNC: 139 U/L
ALT SERPL-CCNC: 20 U/L
ANION GAP SERPL CALC-SCNC: 12 MMOL/L
APPEARANCE: CLEAR
AST SERPL-CCNC: 17 U/L
BASOPHILS # BLD AUTO: 0.07 K/UL
BASOPHILS NFR BLD AUTO: 1.2 %
BILIRUB SERPL-MCNC: 0.5 MG/DL
BILIRUBIN URINE: NEGATIVE
BLOOD URINE: NEGATIVE
BUN SERPL-MCNC: 25 MG/DL
CALCIUM SERPL-MCNC: 10.1 MG/DL
CHLORIDE SERPL-SCNC: 103 MMOL/L
CHOLEST SERPL-MCNC: 120 MG/DL
CO2 SERPL-SCNC: 22 MMOL/L
COLOR: NORMAL
CREAT SERPL-MCNC: 0.9 MG/DL
EGFR: 92 ML/MIN/1.73M2
EOSINOPHIL # BLD AUTO: 0.15 K/UL
EOSINOPHIL NFR BLD AUTO: 2.5 %
ESTIMATED AVERAGE GLUCOSE: 148 MG/DL
GGT SERPL-CCNC: 23 U/L
GLUCOSE QUALITATIVE U: NEGATIVE
GLUCOSE SERPL-MCNC: 126 MG/DL
HBA1C MFR BLD HPLC: 6.8 %
HCT VFR BLD CALC: 43.8 %
HDLC SERPL-MCNC: 48 MG/DL
HGB BLD-MCNC: 14.2 G/DL
IMM GRANULOCYTES NFR BLD AUTO: 0.3 %
KETONES URINE: NEGATIVE
LDLC SERPL CALC-MCNC: 55 MG/DL
LEUKOCYTE ESTERASE URINE: NEGATIVE
LYMPHOCYTES # BLD AUTO: 1.71 K/UL
LYMPHOCYTES NFR BLD AUTO: 28.7 %
MAN DIFF?: NORMAL
MCHC RBC-ENTMCNC: 29.6 PG
MCHC RBC-ENTMCNC: 32.4 GM/DL
MCV RBC AUTO: 91.3 FL
MONOCYTES # BLD AUTO: 0.42 K/UL
MONOCYTES NFR BLD AUTO: 7 %
NEUTROPHILS # BLD AUTO: 3.59 K/UL
NEUTROPHILS NFR BLD AUTO: 60.3 %
NITRITE URINE: NEGATIVE
NONHDLC SERPL-MCNC: 72 MG/DL
PH URINE: 6
PLATELET # BLD AUTO: 165 K/UL
POTASSIUM SERPL-SCNC: 4.9 MMOL/L
PROT SERPL-MCNC: 7 G/DL
PROTEIN URINE: NEGATIVE
PSA SERPL-MCNC: 0.44 NG/ML
RBC # BLD: 4.8 M/UL
RBC # FLD: 12.6 %
SODIUM SERPL-SCNC: 138 MMOL/L
SPECIFIC GRAVITY URINE: 1.02
TRIGL SERPL-MCNC: 82 MG/DL
TSH SERPL-ACNC: 1.81 UIU/ML
UROBILINOGEN URINE: NORMAL
WBC # FLD AUTO: 5.96 K/UL

## 2022-11-15 NOTE — COUNSELING
[Fall prevention counseling provided] : Fall prevention counseling provided [Adequate lighting] : Adequate lighting [No throw rugs] : No throw rugs [Use proper foot wear] : Use proper foot wear [Use recommended devices] : Use recommended devices [Behavioral health counseling provided] : Behavioral health counseling provided [Sleep ___ hours/day] : Sleep [unfilled] hours/day [Engage in a relaxing activity] : Engage in a relaxing activity [Plan in advance] : Plan in advance [Potential consequences of obesity discussed] : Potential consequences of obesity discussed [Benefits of weight loss discussed] : Benefits of weight loss discussed [Structured Weight Management Program suggested:] : Structured weight management program suggested [Encouraged to maintain food diary] : Encouraged to maintain food diary [Encouraged to increase physical activity] : Encouraged to increase physical activity [Target Wt Loss Goal ___] : Weight Loss Goals: Target weight loss goal [unfilled] lbs [Weigh Self Weekly] : weigh self weekly [Decrease Portions] : decrease portions [____ min/wk Activity] : [unfilled] min/wk activity [Keep Food Diary] : keep food diary [None] : None [Good understanding] : Patient has a good understanding of lifestyle changes and steps needed to achieve self management goal [de-identified] : - Discussed diabetes physiology\par - Discussed importance of monitoring blood glucose levels\par - Encouraged a low fat/low cholesterol diet\par - Discussed symptoms of hyperglycemia and hypoglycemia\par - Discussed ADA glucose goals\par - Discussed  HGB A1c and the effects of blood glucose on the level\par - Discussed Healthy eating, avoidance of concentrated sweets, and to include vegetables by at least 2 meals a day\par - Discussed regular exercise\par - Discussed importance of follow up physician visits\par \par low chol diet. Avoid fried foods, red meat, butter, eggs, hard cheeses. Use canola or olive oil preferred.\par \par  - Encouraged a low fat/low cholesterol diet\par  - Discussed Healthy eating, avoidance of concentrated sweets, and to include vegetables by at least 3 meals a day\par  - encouraged low glycemic fruits, grains and vegetables and a diet high in plant protein\par  - Discussed regular exercise\par  - Discussed importance of follow up physician visits\par Bp stable, continue with medications, dash diet, exercise, and dietary management.Continue to check home Bp’s.\par diet and exercise weight loss.  Low-salt low-fat ADA diet/ htn- Discussed diabetes physiology\par - Discussed importance of monitoring blood glucose levels\par - Encouraged a low fat/low cholesterol diet\par - Discussed symptoms of hyperglycemia and hypoglycemia\par - Discussed ADA glucose goals\par - Discussed  HGB A1c and the effects of blood glucose on the level\par - Discussed Healthy eating, avoidance of concentrated sweets, and to include vegetables by at least 2 meals a day\par - Discussed regular exercise\par - Discussed importance of follow up physician visits Limit intake of Sodium (Salt) to less than 2 grams a day to prevent fluid retention-swelling or worsening of symptoms. The importance of keeping the blood pressure at or below 130/80 to prevent stroke, heart attacks, kidney failure, blindness, and loss of limbs was  low chol diet. Avoid fried foods, red meat, butter, eggs, hard cheeses. Use canola or olive oil preferred. ::  was established in which goals would be set, monitoring would be done, and problem solving would also be addressed. The patient would be assisted using behavior change techniques, such as self-help and counseling through behavioral modification: Problem solving using hypnosis and positive medical reinforcement to achieve agreed-upon goals.\par Symptomatic patients : Test for influenza, if positive, treat for influenza and do not continue below. \par 1. Fever plus cough or shortness of breath : Test for RVP and COVID-19.\par 2.Indirect, circumstantial or unclear exposure to COVID-19, or other concerning cases not meeting above criteria: Please call [facility] to discuss testing. \par +++ All above cases must be reported to the [facility] registry. +++\par \par Asymptomatic patients: \par 1. Known first-degree direct-contact exposure to positive COVID-19 patient but asymptomatic: No testing PLUS 14 day self-quarantine. Pt to call if symptoms develop. Report to [facility] Registry.\par 2. No known exposure and asymptomatic, referred from outside healthcare organization: Please call [facility] to discuss testing. \par 3.All other asymptomatic patients with no known exposures: no testing, no exceptions.\par \par Face-to-face counseling 15 minutes\par \par \par \par

## 2022-11-15 NOTE — HISTORY OF PRESENT ILLNESS
[FreeTextEntry1] : F/U,[facility]. Hx of diabetes,htn,CAD,obesity,MI,stents and vit d def, L knee pain.\par Here for flu vaccine, rash on neck=== x2 days [de-identified] : 68 y/o M presents for f/u, [facility]. Pt is here for flu vaccine. Secondary complaint of rash on neck.  Neck is on the left side appears to be herpetic in nature patient complaining of itching and burning, 2 lesions noted appear to be in the C7 radicular pathway.\par \par Hx of diabetes, htn, CAD, Obesity, MI, stents and vit d def. \par Hx of left knee pain on and off.\par He has been applying Diclofenac Sodium 1% external cream on his knee and states that it has been helping him. \par Ambien for hx of insomnia\par Of note, received both doses of the Pfizer vaccine.\par Otherwise patient is stable. \par \par Voices no further complaints.\par ROS as documented below.\par Denies fever,cough,chills,body aches and SOB.

## 2022-11-15 NOTE — PHYSICAL EXAM
[No Acute Distress] : no acute distress [Well Nourished] : well nourished [Well Developed] : well developed [Well-Appearing] : well-appearing [Normal Sclera/Conjunctiva] : normal sclera/conjunctiva [PERRL] : pupils equal round and reactive to light [EOMI] : extraocular movements intact [Normal Outer Ear/Nose] : the outer ears and nose were normal in appearance [Normal Oropharynx] : the oropharynx was normal [No JVD] : no jugular venous distention [No Lymphadenopathy] : no lymphadenopathy [Supple] : supple [Thyroid Normal, No Nodules] : the thyroid was normal and there were no nodules present [No Respiratory Distress] : no respiratory distress  [No Accessory Muscle Use] : no accessory muscle use [Clear to Auscultation] : lungs were clear to auscultation bilaterally [Normal Rate] : normal rate  [Regular Rhythm] : with a regular rhythm [Normal S1, S2] : normal S1 and S2 [No Murmur] : no murmur heard [No Carotid Bruits] : no carotid bruits [No Abdominal Bruit] : a ~M bruit was not heard ~T in the abdomen [No Varicosities] : no varicosities [Pedal Pulses Present] : the pedal pulses are present [No Edema] : there was no peripheral edema [No Palpable Aorta] : no palpable aorta [No Extremity Clubbing/Cyanosis] : no extremity clubbing/cyanosis [Soft] : abdomen soft [Non Tender] : non-tender [Non-distended] : non-distended [No Masses] : no abdominal mass palpated [No HSM] : no HSM [Normal Bowel Sounds] : normal bowel sounds [Normal Posterior Cervical Nodes] : no posterior cervical lymphadenopathy [Normal Anterior Cervical Nodes] : no anterior cervical lymphadenopathy [No CVA Tenderness] : no CVA  tenderness [No Spinal Tenderness] : no spinal tenderness [No Joint Swelling] : no joint swelling [Grossly Normal Strength/Tone] : grossly normal strength/tone [Coordination Grossly Intact] : coordination grossly intact [No Focal Deficits] : no focal deficits [Normal Gait] : normal gait [Deep Tendon Reflexes (DTR)] : deep tendon reflexes were 2+ and symmetric [Normal Affect] : the affect was normal [Normal Insight/Judgement] : insight and judgment were intact [de-identified] : Overweight [de-identified] : left lateral neck- maculopapular erythematous rash=== size of a dime.  2 isolated lesions

## 2022-11-15 NOTE — REVIEW OF SYSTEMS
[Negative] : Heme/Lymph [Skin Rash] : skin rash [Impotence] : impotence [Poor Libido] : poor libido [FreeTextEntry2] : Weight gain [de-identified] : Vesicular type rash

## 2022-12-25 ENCOUNTER — RX RENEWAL (OUTPATIENT)
Age: 67
End: 2022-12-25

## 2023-01-23 ENCOUNTER — RX RENEWAL (OUTPATIENT)
Age: 68
End: 2023-01-23

## 2023-02-09 ENCOUNTER — RX RENEWAL (OUTPATIENT)
Age: 68
End: 2023-02-09

## 2023-02-21 RX ORDER — BLOOD-GLUCOSE METER
W/DEVICE KIT MISCELLANEOUS
Qty: 1 | Refills: 0 | Status: ACTIVE | COMMUNITY
Start: 2021-12-13 | End: 1900-01-01

## 2023-02-22 ENCOUNTER — RX RENEWAL (OUTPATIENT)
Age: 68
End: 2023-02-22

## 2023-04-11 ENCOUNTER — RX RENEWAL (OUTPATIENT)
Age: 68
End: 2023-04-11

## 2023-05-01 ENCOUNTER — APPOINTMENT (OUTPATIENT)
Dept: INTERNAL MEDICINE | Facility: CLINIC | Age: 68
End: 2023-05-01
Payer: MEDICARE

## 2023-05-01 ENCOUNTER — NON-APPOINTMENT (OUTPATIENT)
Age: 68
End: 2023-05-01

## 2023-05-01 VITALS
HEIGHT: 65 IN | RESPIRATION RATE: 18 BRPM | DIASTOLIC BLOOD PRESSURE: 76 MMHG | HEART RATE: 97 BPM | WEIGHT: 183 LBS | OXYGEN SATURATION: 98 % | BODY MASS INDEX: 30.49 KG/M2 | TEMPERATURE: 97.4 F | SYSTOLIC BLOOD PRESSURE: 124 MMHG

## 2023-05-01 DIAGNOSIS — H91.92 UNSPECIFIED HEARING LOSS, LEFT EAR: ICD-10-CM

## 2023-05-01 PROCEDURE — 99215 OFFICE O/P EST HI 40 MIN: CPT | Mod: 25

## 2023-05-01 PROCEDURE — G0447 BEHAVIOR COUNSEL OBESITY 15M: CPT

## 2023-05-01 PROCEDURE — 99497 ADVNCD CARE PLAN 30 MIN: CPT | Mod: 25

## 2023-05-01 PROCEDURE — 93000 ELECTROCARDIOGRAM COMPLETE: CPT

## 2023-05-01 PROCEDURE — G0439: CPT

## 2023-05-01 RX ORDER — DICLOFENAC SODIUM 1% 10 MG/G
1 GEL TOPICAL
Qty: 100 | Refills: 3 | Status: ACTIVE | COMMUNITY
Start: 2022-04-25 | End: 1900-01-01

## 2023-05-02 LAB
25(OH)D3 SERPL-MCNC: 21.4 NG/ML
ALBUMIN SERPL ELPH-MCNC: 4.7 G/DL
ALP BLD-CCNC: 146 U/L
ALT SERPL-CCNC: 23 U/L
ANION GAP SERPL CALC-SCNC: 12 MMOL/L
APPEARANCE: CLEAR
AST SERPL-CCNC: 17 U/L
BASOPHILS # BLD AUTO: 0.05 K/UL
BASOPHILS NFR BLD AUTO: 0.9 %
BILIRUB SERPL-MCNC: 0.5 MG/DL
BILIRUBIN URINE: NEGATIVE
BLOOD URINE: NEGATIVE
BUN SERPL-MCNC: 16 MG/DL
CALCIUM SERPL-MCNC: 10.1 MG/DL
CHLORIDE SERPL-SCNC: 106 MMOL/L
CHOLEST SERPL-MCNC: 127 MG/DL
CO2 SERPL-SCNC: 24 MMOL/L
COLOR: YELLOW
CREAT SERPL-MCNC: 0.91 MG/DL
EGFR: 91 ML/MIN/1.73M2
EOSINOPHIL # BLD AUTO: 0.14 K/UL
EOSINOPHIL NFR BLD AUTO: 2.4 %
ESTIMATED AVERAGE GLUCOSE: 160 MG/DL
GGT SERPL-CCNC: 28 U/L
GLUCOSE QUALITATIVE U: NEGATIVE MG/DL
GLUCOSE SERPL-MCNC: 138 MG/DL
HBA1C MFR BLD HPLC: 7.2 %
HCT VFR BLD CALC: 44.4 %
HDLC SERPL-MCNC: 47 MG/DL
HGB BLD-MCNC: 14.2 G/DL
IMM GRANULOCYTES NFR BLD AUTO: 0.2 %
KETONES URINE: NEGATIVE MG/DL
LDLC SERPL CALC-MCNC: 52 MG/DL
LEUKOCYTE ESTERASE URINE: NEGATIVE
LYMPHOCYTES # BLD AUTO: 1.69 K/UL
LYMPHOCYTES NFR BLD AUTO: 28.8 %
MAN DIFF?: NORMAL
MCHC RBC-ENTMCNC: 29.8 PG
MCHC RBC-ENTMCNC: 32 GM/DL
MCV RBC AUTO: 93.3 FL
MONOCYTES # BLD AUTO: 0.39 K/UL
MONOCYTES NFR BLD AUTO: 6.7 %
NEUTROPHILS # BLD AUTO: 3.58 K/UL
NEUTROPHILS NFR BLD AUTO: 61 %
NITRITE URINE: NEGATIVE
NONHDLC SERPL-MCNC: 80 MG/DL
PH URINE: 5.5
PLATELET # BLD AUTO: 146 K/UL
POTASSIUM SERPL-SCNC: 4.8 MMOL/L
PROT SERPL-MCNC: 6.9 G/DL
PROTEIN URINE: NEGATIVE MG/DL
PSA FREE FLD-MCNC: 27 %
PSA FREE SERPL-MCNC: 0.11 NG/ML
PSA SERPL-MCNC: 0.39 NG/ML
RBC # BLD: 4.76 M/UL
RBC # FLD: 13.2 %
SODIUM SERPL-SCNC: 141 MMOL/L
SPECIFIC GRAVITY URINE: 1.02
TRIGL SERPL-MCNC: 142 MG/DL
TSH SERPL-ACNC: 1.91 UIU/ML
UROBILINOGEN URINE: 0.2 MG/DL
WBC # FLD AUTO: 5.86 K/UL

## 2023-05-02 NOTE — HISTORY OF PRESENT ILLNESS
[FreeTextEntry1] : F/U,[facility] and an Annual Wellness. Hx of diabetes,htn,CAD,obesity,MI,stents and vit d def. chief complaint today is hearing loss and knee pain.  Patient also requesting referrals to ENT for audiology testing cardiology eval which he missed because of COVID [de-identified] : 67 y/o M presents for f/u, [facility] & annual wellness\par Hx of diabetes, htn, CAD, Obesity, MI, stents and vit d def. \par cc: L knee pain, deafness==== patient has had left knee pain for quite some time now denies any given injury.  Deafness is well has been a chronic issue\par \par Patient is here for routine blood work and an EKG.\par Of note, received both doses of the Pfizer vaccine.\par Otherwise patient is stable. \par \par Voices no further complaints.\par ROS as documented below.\par Denies fever,cough,chills,body aches and SOB.

## 2023-05-02 NOTE — REVIEW OF SYSTEMS
[Joint Pain] : joint pain [Back Pain] : back pain [Negative] : Heme/Lymph [Hearing Loss] : hearing loss [Insomnia] : insomnia [Joint Stiffness] : joint stiffness [Joint Swelling] : joint swelling [FreeTextEntry9] : Lower back pain and left knee pain /left knee pain

## 2023-05-02 NOTE — END OF VISIT
[FreeTextEntry4] : I Kyle Melendrez, am scribing for and in the presence of Dr. Guerrero, the following sections of:  HISTORY OF PRESENT ILLNESS, PAST MEDICAL/FAMILY/SOCIAL HISTORY, ROS, VITALS, PE, DISPOSITION

## 2023-05-02 NOTE — COUNSELING
[Fall prevention counseling provided] : Fall prevention counseling provided [Adequate lighting] : Adequate lighting [No throw rugs] : No throw rugs [Use proper foot wear] : Use proper foot wear [Use recommended devices] : Use recommended devices [Behavioral health counseling provided] : Behavioral health counseling provided [Sleep ___ hours/day] : Sleep [unfilled] hours/day [Engage in a relaxing activity] : Engage in a relaxing activity [Plan in advance] : Plan in advance [Potential consequences of obesity discussed] : Potential consequences of obesity discussed [Benefits of weight loss discussed] : Benefits of weight loss discussed [Structured Weight Management Program suggested:] : Structured weight management program suggested [Encouraged to maintain food diary] : Encouraged to maintain food diary [Encouraged to increase physical activity] : Encouraged to increase physical activity [Target Wt Loss Goal ___] : Weight Loss Goals: Target weight loss goal [unfilled] lbs [Weigh Self Weekly] : weigh self weekly [Decrease Portions] : decrease portions [____ min/wk Activity] : [unfilled] min/wk activity [None] : None [Good understanding] : Patient has a good understanding of lifestyle changes and steps needed to achieve self management goal [Encouraged to use exercise tracking device] : Encouraged to use exercise tracking device [Keep Food Diary] : keep food diary [FreeTextEntry4] : 15 [de-identified] : Medical Annual wellness visit completed:\par HRA completed and reviewed with patient\par Medical, family, surgical history reviewed with patient and updated\par List of current providers r/w patient and updated\par Vitals, BMI reviewed and discussed along with healthy BMI goals. Dietary counseling x 15 minutes provided\par Depression PHQ 9 completed and reviewed \par Annual safety assessment reviewed\par discussed advanced directives\par smoking cessation counseling provided\par Established routine screening and immunization schedules\par \par VACCINATION & OTHER TX RECOMMENDATIONS\par \par ASA preventative therapy\par Calcium/Vitamin D supplementation\par  \par Dietary counseling, nutrition referral\par risks vs. benefits d/w patient. routine vaccination and vaccination schedules and recommendation d/w patient\par \par Vaccines recommended: \par * pneumovax (once after 65) & prevnar\par * annual Influenza vaccine\par * Hep B vaccines\par * zostavax\par * Tdap\par \par Colorectal screening recommended; screening colonoscopy q10yr, flex sig q5yr, annual fecal occult testing\par BMD recommended biennially for osteoporosis screening\par Glaucoma screening recommended, annual optho evals\par Cardiovascular screening and blood tests recommended and discussed w/ patient, cholesterol screening and dietary counseling\par AAA recommended x 1\par \par Met with SHARON CARMICHAEL, who was willing to discuss advance care planning. \par Our advance care planning conversation included a discussion about:\par 1. The value and importance of advance care planning.\par 2. Experiences with loved ones who have been seriously ill or have .\par 3. Exploration of personal, cultural, or spiritual beliefs that might influence medical decisions.\par 4. Exploration of goals of care in the event of a sudden injury or illness.\par 5. Identification of a health care agent.\par 6. Review and update, or completion of, an advance directive.\par Start time: ___1000am_________                    End time: ___1015am_________\par \par diet and exercise weight loss.  Low-salt low-fat ADA diet/ htn- Discussed diabetes physiology\par - Discussed importance of monitoring blood glucose levels\par - Encouraged a low fat/low cholesterol diet\par - Discussed symptoms of hyperglycemia and hypoglycemia\par - Discussed ADA glucose goals\par - Discussed  HGB A1c and the effects of blood glucose on the level\par - Discussed Healthy eating, avoidance of concentrated sweets, and to include vegetables by at least 2 meals a day\par - Discussed regular exercise\par - Discussed importance of follow up physician visits Limit intake of Sodium (Salt) to less than 2 grams a day to prevent fluid retention-swelling or worsening of symptoms. The importance of keeping the blood pressure at or below 130/80 to prevent stroke, heart attacks, kidney failure, blindness, and loss of limbs was  low chol diet. Avoid fried foods, red meat, butter, eggs, hard cheeses. Use canola or olive oil preferred. ::  was established in which goals would be set, monitoring would be done, and problem solving would also be addressed. The patient would be assisted using behavior change techniques, such as self-help and counseling through behavioral modification: Problem solving using hypnosis and positive medical reinforcement to achieve agreed-upon goals.\par \par Symptomatic patients : Test for influenza, if positive, treat for influenza and do not continue below. \par 1. Fever plus cough or shortness of breath : Test for RVP and COVID-19.\par 2.Indirect, circumstantial or unclear exposure to COVID-19, or other concerning cases not meeting above criteria: Please call [facility] to discuss testing. \par +++ All above cases must be reported to the [facility] registry. +++\par \par Asymptomatic patients: \par 1. Known first-degree direct-contact exposure to positive COVID-19 patient but asymptomatic: No testing PLUS 14 day self-quarantine. Pt to call if symptoms develop. Report to [facility] Registry.\par 2. No known exposure and asymptomatic, referred from outside healthcare organization: Please call [facility] to discuss testing. \par 3.All other asymptomatic patients with no known exposures: no testing, no exceptions.\par \par \par

## 2023-05-02 NOTE — PHYSICAL EXAM
[No Acute Distress] : no acute distress [Well Nourished] : well nourished [Well Developed] : well developed [Well-Appearing] : well-appearing [Normal Sclera/Conjunctiva] : normal sclera/conjunctiva [PERRL] : pupils equal round and reactive to light [EOMI] : extraocular movements intact [Normal Outer Ear/Nose] : the outer ears and nose were normal in appearance [Normal Oropharynx] : the oropharynx was normal [No JVD] : no jugular venous distention [No Lymphadenopathy] : no lymphadenopathy [Supple] : supple [Thyroid Normal, No Nodules] : the thyroid was normal and there were no nodules present [No Respiratory Distress] : no respiratory distress  [No Accessory Muscle Use] : no accessory muscle use [Clear to Auscultation] : lungs were clear to auscultation bilaterally [Normal Rate] : normal rate  [Regular Rhythm] : with a regular rhythm [Normal S1, S2] : normal S1 and S2 [No Murmur] : no murmur heard [No Carotid Bruits] : no carotid bruits [No Abdominal Bruit] : a ~M bruit was not heard ~T in the abdomen [No Varicosities] : no varicosities [Pedal Pulses Present] : the pedal pulses are present [No Edema] : there was no peripheral edema [No Palpable Aorta] : no palpable aorta [No Extremity Clubbing/Cyanosis] : no extremity clubbing/cyanosis [Soft] : abdomen soft [Non Tender] : non-tender [Non-distended] : non-distended [No Masses] : no abdominal mass palpated [No HSM] : no HSM [Normal Bowel Sounds] : normal bowel sounds [No CVA Tenderness] : no CVA  tenderness [No Spinal Tenderness] : no spinal tenderness [No Joint Swelling] : no joint swelling [Grossly Normal Strength/Tone] : grossly normal strength/tone [No Rash] : no rash [Coordination Grossly Intact] : coordination grossly intact [No Focal Deficits] : no focal deficits [Normal Gait] : normal gait [Deep Tendon Reflexes (DTR)] : deep tendon reflexes were 2+ and symmetric [Normal Affect] : the affect was normal [Normal Insight/Judgement] : insight and judgment were intact [de-identified] : obese [de-identified] : deafness [de-identified] : Swelling decreased range of motion crepitations left knee

## 2023-05-02 NOTE — HEALTH RISK ASSESSMENT
[Fair] :  ~his/her~ mood as fair [Yes] : Yes [2 - 4 times a month (2 pts)] : 2-4 times a month (2 points) [1 or 2 (0 pts)] : 1 or 2 (0 points) [Never (0 pts)] : Never (0 points) [No] : In the past 12 months have you used drugs other than those required for medical reasons? No [No falls in past year] : Patient reported no falls in the past year [0] : 2) Feeling down, depressed, or hopeless: Not at all (0) [PHQ-2 Negative - No further assessment needed] : PHQ-2 Negative - No further assessment needed [None] : None [With Significant Other] : lives with significant other [Employed] : employed [] :  [Sexually Active] : sexually active [Feels Safe at Home] : Feels safe at home [Fully functional (bathing, dressing, toileting, transferring, walking, feeding)] : Fully functional (bathing, dressing, toileting, transferring, walking, feeding) [Fully functional (using the telephone, shopping, preparing meals, housekeeping, doing laundry, using] : Fully functional and needs no help or supervision to perform IADLs (using the telephone, shopping, preparing meals, housekeeping, doing laundry, using transportation, managing medications and managing finances) [Carbon Monoxide Detector] : carbon monoxide detector [Safety elements used in home] : safety elements used in home [Seat Belt] :  uses seat belt [Sunscreen] : uses sunscreen [Reviewed no changes] : Reviewed, no changes [Designated Healthcare Proxy] : Designated healthcare proxy [Relationship: ___] : Relationship: [unfilled] [Aggressive treatment] : aggressive treatment [Last Verification Date: ___] : Last Verification Date: [unfilled] [Time Spent: ___ minutes] : Time Spent: [unfilled] minutes [I will adhere to the patient's wishes.] : I will adhere to the patient's wishes. [Never] : Never [Audit-CScore] : 2 [de-identified] : Light [de-identified] : Standard [[id number]] : 0 [Change in mental status noted] : No change in mental status noted [Language] : denies difficulty with language [Behavior] : denies difficulty with behavior [Learning/Retaining New Information] : denies difficulty learning/retaining new information [Handling Complex Tasks] : denies difficulty handling complex tasks [Reasoning] : denies difficulty with reasoning [Spatial Ability and Orientation] : denies difficulty with spatial ability and orientation [High Risk Behavior] : no high risk behavior [Reports changes in hearing] : Reports no changes in hearing [Reports changes in vision] : Reports no changes in vision [Reports normal functional visual acuity (ie: able to read med bottle)] : Reports poor functional visual acuity.  [Reports changes in dental health] : Reports no changes in dental health [Guns at Home] : no guns at home [Travel to Developing Areas] : does not  travel to developing areas [ColonoscopyComments] : NOT UTD [FreeTextEntry2] :

## 2023-05-03 ENCOUNTER — NON-APPOINTMENT (OUTPATIENT)
Age: 68
End: 2023-05-03

## 2023-05-15 ENCOUNTER — APPOINTMENT (OUTPATIENT)
Dept: CARDIOLOGY | Facility: CLINIC | Age: 68
End: 2023-05-15
Payer: MEDICARE

## 2023-05-15 VITALS
TEMPERATURE: 97.8 F | DIASTOLIC BLOOD PRESSURE: 63 MMHG | HEART RATE: 82 BPM | HEIGHT: 65 IN | SYSTOLIC BLOOD PRESSURE: 101 MMHG | OXYGEN SATURATION: 97 % | BODY MASS INDEX: 30.82 KG/M2 | WEIGHT: 185 LBS

## 2023-05-15 PROCEDURE — 99215 OFFICE O/P EST HI 40 MIN: CPT

## 2023-05-15 NOTE — HISTORY OF PRESENT ILLNESS
[FreeTextEntry1] : SHARON CARMICHAEL 67 year M BMI  , presents with HTN HLD abnormal EKG and reports  NYHA 2-3 dyspnea, non-exertional chest pain, no palpitations, no syncope, no claudication mild peripheral edema.   Tobacco Negative/Reformed. BP  124/76    ;  HR   97    BPM; 5/1/23 EKG NSR chronic RBBB LAFB. 2/22 echo LVEF 65% DD1; 2017 stress neg/neg isch/inf LVEF 60%. Meds ASA 81, atorvastatin 40,  metoprolol 25, lidinopril 10mg. RTC3m\par Total visit time 65min. Nil to suggest sarcoid or amyloid.

## 2023-07-26 ENCOUNTER — RX RENEWAL (OUTPATIENT)
Age: 68
End: 2023-07-26

## 2023-08-01 ENCOUNTER — RX RENEWAL (OUTPATIENT)
Age: 68
End: 2023-08-01

## 2023-08-24 ENCOUNTER — RX RENEWAL (OUTPATIENT)
Age: 68
End: 2023-08-24

## 2023-11-08 ENCOUNTER — RX RENEWAL (OUTPATIENT)
Age: 68
End: 2023-11-08

## 2023-11-16 ENCOUNTER — RX RENEWAL (OUTPATIENT)
Age: 68
End: 2023-11-16

## 2023-11-20 ENCOUNTER — APPOINTMENT (OUTPATIENT)
Dept: INTERNAL MEDICINE | Facility: CLINIC | Age: 68
End: 2023-11-20
Payer: MEDICARE

## 2023-11-20 VITALS
HEIGHT: 65 IN | RESPIRATION RATE: 18 BRPM | BODY MASS INDEX: 30.32 KG/M2 | WEIGHT: 182 LBS | SYSTOLIC BLOOD PRESSURE: 120 MMHG | TEMPERATURE: 97.2 F | OXYGEN SATURATION: 97 % | HEART RATE: 83 BPM | DIASTOLIC BLOOD PRESSURE: 68 MMHG

## 2023-11-20 DIAGNOSIS — Z23 ENCOUNTER FOR IMMUNIZATION: ICD-10-CM

## 2023-11-20 PROCEDURE — 90662 IIV NO PRSV INCREASED AG IM: CPT

## 2023-11-20 PROCEDURE — 99214 OFFICE O/P EST MOD 30 MIN: CPT | Mod: 25

## 2023-11-20 PROCEDURE — G0008: CPT

## 2023-11-20 PROCEDURE — 99401 PREV MED CNSL INDIV APPRX 15: CPT | Mod: 25,GY

## 2023-11-27 ENCOUNTER — APPOINTMENT (OUTPATIENT)
Dept: INTERNAL MEDICINE | Facility: CLINIC | Age: 68
End: 2023-11-27
Payer: MEDICARE

## 2023-11-27 VITALS
WEIGHT: 182 LBS | HEART RATE: 78 BPM | RESPIRATION RATE: 17 BRPM | HEIGHT: 65 IN | DIASTOLIC BLOOD PRESSURE: 76 MMHG | TEMPERATURE: 96.4 F | OXYGEN SATURATION: 98 % | BODY MASS INDEX: 30.32 KG/M2 | SYSTOLIC BLOOD PRESSURE: 126 MMHG

## 2023-11-27 DIAGNOSIS — Z95.5 PRESENCE OF CORONARY ANGIOPLASTY IMPLANT AND GRAFT: ICD-10-CM

## 2023-11-27 PROCEDURE — G0010: CPT

## 2023-11-27 PROCEDURE — 90746 HEPB VACCINE 3 DOSE ADULT IM: CPT

## 2023-11-27 PROCEDURE — 99213 OFFICE O/P EST LOW 20 MIN: CPT | Mod: 25

## 2023-11-27 RX ORDER — SILDENAFIL 50 MG/1
50 TABLET ORAL
Qty: 30 | Refills: 3 | Status: ACTIVE | COMMUNITY
Start: 2023-11-27 | End: 1900-01-01

## 2023-11-28 LAB
25(OH)D3 SERPL-MCNC: 32.3 NG/ML
ALBUMIN SERPL ELPH-MCNC: 4.2 G/DL
ALP BLD-CCNC: 145 U/L
ALT SERPL-CCNC: 22 U/L
ANION GAP SERPL CALC-SCNC: 12 MMOL/L
APPEARANCE: CLEAR
AST SERPL-CCNC: 17 U/L
BASOPHILS # BLD AUTO: 0.05 K/UL
BASOPHILS NFR BLD AUTO: 0.9 %
BILIRUB SERPL-MCNC: 0.4 MG/DL
BILIRUBIN URINE: NEGATIVE
BLOOD URINE: NEGATIVE
BUN SERPL-MCNC: 19 MG/DL
CALCIUM SERPL-MCNC: 9.2 MG/DL
CHLORIDE SERPL-SCNC: 104 MMOL/L
CHOLEST SERPL-MCNC: 116 MG/DL
CO2 SERPL-SCNC: 23 MMOL/L
COLOR: YELLOW
CREAT SERPL-MCNC: 1.02 MG/DL
EGFR: 79 ML/MIN/1.73M2
EOSINOPHIL # BLD AUTO: 0.21 K/UL
EOSINOPHIL NFR BLD AUTO: 3.9 %
ESTIMATED AVERAGE GLUCOSE: 154 MG/DL
GGT SERPL-CCNC: 23 U/L
GLUCOSE QUALITATIVE U: NEGATIVE MG/DL
GLUCOSE SERPL-MCNC: 150 MG/DL
HBA1C MFR BLD HPLC: 7 %
HCT VFR BLD CALC: 40.1 %
HDLC SERPL-MCNC: 43 MG/DL
HGB BLD-MCNC: 13 G/DL
IMM GRANULOCYTES NFR BLD AUTO: 0.2 %
KETONES URINE: NEGATIVE MG/DL
LDLC SERPL CALC-MCNC: 53 MG/DL
LEUKOCYTE ESTERASE URINE: NEGATIVE
LYMPHOCYTES # BLD AUTO: 1.43 K/UL
LYMPHOCYTES NFR BLD AUTO: 26.9 %
MAN DIFF?: NORMAL
MCHC RBC-ENTMCNC: 30.2 PG
MCHC RBC-ENTMCNC: 32.4 GM/DL
MCV RBC AUTO: 93 FL
MONOCYTES # BLD AUTO: 0.45 K/UL
MONOCYTES NFR BLD AUTO: 8.5 %
NEUTROPHILS # BLD AUTO: 3.17 K/UL
NEUTROPHILS NFR BLD AUTO: 59.6 %
NITRITE URINE: NEGATIVE
NONHDLC SERPL-MCNC: 73 MG/DL
PH URINE: 6
PLATELET # BLD AUTO: NORMAL K/UL
POTASSIUM SERPL-SCNC: 4.6 MMOL/L
PROT SERPL-MCNC: 6.4 G/DL
PROTEIN URINE: NORMAL MG/DL
PSA SERPL-MCNC: 0.38 NG/ML
RBC # BLD: 4.31 M/UL
RBC # FLD: 12.7 %
SODIUM SERPL-SCNC: 139 MMOL/L
SPECIFIC GRAVITY URINE: 1.02
TRIGL SERPL-MCNC: 107 MG/DL
TSH SERPL-ACNC: 2.01 UIU/ML
UROBILINOGEN URINE: 1 MG/DL
WBC # FLD AUTO: 5.32 K/UL

## 2024-01-03 ENCOUNTER — APPOINTMENT (OUTPATIENT)
Dept: INTERNAL MEDICINE | Facility: CLINIC | Age: 69
End: 2024-01-03
Payer: MEDICARE

## 2024-01-03 VITALS
WEIGHT: 185 LBS | TEMPERATURE: 96.8 F | RESPIRATION RATE: 17 BRPM | DIASTOLIC BLOOD PRESSURE: 62 MMHG | BODY MASS INDEX: 30.82 KG/M2 | SYSTOLIC BLOOD PRESSURE: 112 MMHG | OXYGEN SATURATION: 97 % | HEIGHT: 65 IN | HEART RATE: 86 BPM

## 2024-01-03 DIAGNOSIS — I11.9 HYPERTENSIVE HEART DISEASE W/OUT HEART FAILURE: ICD-10-CM

## 2024-01-03 DIAGNOSIS — J06.9 ACUTE UPPER RESPIRATORY INFECTION, UNSPECIFIED: ICD-10-CM

## 2024-01-03 DIAGNOSIS — J32.9 CHRONIC SINUSITIS, UNSPECIFIED: ICD-10-CM

## 2024-01-03 DIAGNOSIS — N52.9 MALE ERECTILE DYSFUNCTION, UNSPECIFIED: ICD-10-CM

## 2024-01-03 DIAGNOSIS — E66.9 OBESITY, UNSPECIFIED: ICD-10-CM

## 2024-01-03 LAB
FLUAV SPEC QL CULT: NEGATIVE
FLUBV AG SPEC QL IA: NEGATIVE
S PYO AG SPEC QL IA: NEGATIVE

## 2024-01-03 PROCEDURE — 99214 OFFICE O/P EST MOD 30 MIN: CPT | Mod: 25

## 2024-01-03 PROCEDURE — G2211 COMPLEX E/M VISIT ADD ON: CPT

## 2024-01-03 PROCEDURE — 99401 PREV MED CNSL INDIV APPRX 15: CPT | Mod: GY

## 2024-01-03 PROCEDURE — 87880 STREP A ASSAY W/OPTIC: CPT | Mod: QW

## 2024-01-03 PROCEDURE — 87804 INFLUENZA ASSAY W/OPTIC: CPT | Mod: QW

## 2024-01-03 RX ORDER — GUAIFENESIN 100 MG/5ML
100 LIQUID ORAL EVERY 4 HOURS
Qty: 100 | Refills: 0 | Status: ACTIVE | COMMUNITY
Start: 2024-01-03 | End: 1900-01-01

## 2024-01-03 RX ORDER — BENZONATATE 100 MG/1
100 CAPSULE ORAL EVERY 6 HOURS
Qty: 21 | Refills: 0 | Status: ACTIVE | COMMUNITY
Start: 2024-01-03 | End: 1900-01-01

## 2024-01-03 RX ORDER — AZITHROMYCIN 250 MG/1
250 TABLET, FILM COATED ORAL
Qty: 1 | Refills: 0 | Status: ACTIVE | COMMUNITY
Start: 2024-01-03 | End: 1900-01-01

## 2024-01-03 NOTE — HEALTH RISK ASSESSMENT
[No] : No [No falls in past year] : Patient reported no falls in the past year [0] : 2) Feeling down, depressed, or hopeless: Not at all (0) [Patient refused screening] : Patient refused screening [PHQ-2 Negative - No further assessment needed] : PHQ-2 Negative - No further assessment needed [Reviewed no changes] : Reviewed, no changes [I will adhere to the patient's wishes.] : I will adhere to the patient's wishes. [de-identified] : Minimal

## 2024-01-03 NOTE — COUNSELING
[Fall prevention counseling provided] : Fall prevention counseling provided [Adequate lighting] : Adequate lighting [No throw rugs] : No throw rugs [Use proper foot wear] : Use proper foot wear [Potential consequences of obesity discussed] : Potential consequences of obesity discussed [Benefits of weight loss discussed] : Benefits of weight loss discussed [Structured Weight Management Program suggested:] : Structured weight management program suggested [Encouraged to maintain food diary] : Encouraged to maintain food diary [Encouraged to increase physical activity] : Encouraged to increase physical activity [Encouraged to use exercise tracking device] : Encouraged to use exercise tracking device [Target Wt Loss Goal ___] : Weight Loss Goals: Target weight loss goal [unfilled] lbs [Weigh Self Weekly] : weigh self weekly [Decrease Portions] : decrease portions [____ min/wk Activity] : [unfilled] min/wk activity [Keep Food Diary] : keep food diary [None] : None [Good understanding] : Patient has a good understanding of lifestyle changes and steps needed to achieve self management goal [de-identified] : Total face-to-face time with patient - 15__ minutes; >50% involved counselling, review of labs/tests, and/or coordination of medical care:  Avoid sweets and sugary drinks, diet drinks or water preferred, minimize carbohydrates such as bread, rice, pasta and potatoes - Discussed diabetes physiology - Discussed importance of monitoring blood glucose levels - Encouraged a low fat/low cholesterol diet - Discussed symptoms of hyperglycemia and hypoglycemia - Discussed ADA glucose goals - Discussed  HGB A1c and the effects of blood glucose on the level - Discussed Healthy eating, avoidance of concentrated sweets, and to include vegetables by at least 2 meals a day - Discussed regular exercise - Discussed importance of follow up physician visits  low chol diet. Avoid fried foods, red meat, butter, eggs, hard cheeses. Use canola or olive oil preferred.   - Encouraged a low fat/low cholesterol diet  - Discussed Healthy eating, avoidance of concentrated sweets, and to include vegetables by at least 3 meals a day  - encouraged low glycemic fruits, grains and vegetables and a diet high in plant protein  - Discussed regular exercise  - Discussed importance of follow up physician visits  Bp stable, continue with medications, dash diet, exercise, and dietary management.Continue to check home Bps.

## 2024-01-03 NOTE — PHYSICAL EXAM
[No Acute Distress] : no acute distress [Well Nourished] : well nourished [Well Developed] : well developed [Well-Appearing] : well-appearing [Normal Sclera/Conjunctiva] : normal sclera/conjunctiva [PERRL] : pupils equal round and reactive to light [EOMI] : extraocular movements intact [Normal Outer Ear/Nose] : the outer ears and nose were normal in appearance [No JVD] : no jugular venous distention [No Lymphadenopathy] : no lymphadenopathy [Supple] : supple [Thyroid Normal, No Nodules] : the thyroid was normal and there were no nodules present [No Respiratory Distress] : no respiratory distress  [No Accessory Muscle Use] : no accessory muscle use [Clear to Auscultation] : lungs were clear to auscultation bilaterally [Normal Rate] : normal rate  [Regular Rhythm] : with a regular rhythm [Normal S1, S2] : normal S1 and S2 [No Murmur] : no murmur heard [No Carotid Bruits] : no carotid bruits [No Abdominal Bruit] : a ~M bruit was not heard ~T in the abdomen [No Varicosities] : no varicosities [Pedal Pulses Present] : the pedal pulses are present [No Edema] : there was no peripheral edema [No Palpable Aorta] : no palpable aorta [No Extremity Clubbing/Cyanosis] : no extremity clubbing/cyanosis [Soft] : abdomen soft [Non Tender] : non-tender [Non-distended] : non-distended [No Masses] : no abdominal mass palpated [No HSM] : no HSM [Normal Bowel Sounds] : normal bowel sounds [Normal Posterior Cervical Nodes] : no posterior cervical lymphadenopathy [Normal Anterior Cervical Nodes] : no anterior cervical lymphadenopathy [No CVA Tenderness] : no CVA  tenderness [No Spinal Tenderness] : no spinal tenderness [No Joint Swelling] : no joint swelling [Grossly Normal Strength/Tone] : grossly normal strength/tone [No Rash] : no rash [Coordination Grossly Intact] : coordination grossly intact [No Focal Deficits] : no focal deficits [Normal Gait] : normal gait [Deep Tendon Reflexes (DTR)] : deep tendon reflexes were 2+ and symmetric [Normal Affect] : the affect was normal [Normal Insight/Judgement] : insight and judgment were intact [Normal] : no rash [de-identified] : BMI 30 point [de-identified] : Throat injected, effusion tympanic membranes bilaterally [de-identified] : Lungs clear to AMP [de-identified] : Right shoulder pain and stiffness improved

## 2024-01-03 NOTE — REVIEW OF SYSTEMS
[Negative] : Heme/Lymph [Nasal Discharge] : no nasal discharge [Sore Throat] : sore throat [Cough] : cough

## 2024-01-03 NOTE — HISTORY OF PRESENT ILLNESS
[de-identified] : 69 y/o M presents for f/u, GHM  Hx of diabetes, htn, CAD, Obesity, MI, stents and vit d def. Patient denies fever chills cough chest pain or shortness of breath General health maintenance Of note, received both doses of the Pfizer vaccine. Otherwise patient is stable.   Voices no further complaints. ROS as documented below. Denies fever,cough,chills,body aches and SOB.  General health maintenance for this 68-year-old male with history of BPH CAD diabetes mellitus erectile dysfunction hypertension hyperlipidemia and hypertensive heart disease I Kaila Bancroft, madeleine scribing for and in the presence of Dr. Guerrero, the following sections of:  HISTORY OF PRESENT ILLNESS, PAST MEDICAL/FAMILY/SOCIAL HISTORY, ROS, VITALS, PE, DISPOSITION [Spouse] : spouse [FreeTextEntry1] : Sore throat and cough headache muscle aches fever chills x 3 days.

## 2024-01-04 LAB
HMPV RNA SPEC QL NAA+PROBE: DETECTED
RAPID RVP RESULT: DETECTED
SARS-COV-2 RNA PNL RESP NAA+PROBE: NOT DETECTED

## 2024-02-12 RX ORDER — METOPROLOL TARTRATE 25 MG/1
25 TABLET, FILM COATED ORAL TWICE DAILY
Qty: 180 | Refills: 0 | Status: ACTIVE | COMMUNITY
Start: 2017-12-05 | End: 1900-01-01

## 2024-04-01 ENCOUNTER — APPOINTMENT (OUTPATIENT)
Dept: INTERNAL MEDICINE | Facility: CLINIC | Age: 69
End: 2024-04-01
Payer: MEDICARE

## 2024-04-01 VITALS
TEMPERATURE: 96.6 F | HEART RATE: 76 BPM | BODY MASS INDEX: 30.32 KG/M2 | OXYGEN SATURATION: 97 % | WEIGHT: 182 LBS | RESPIRATION RATE: 17 BRPM | SYSTOLIC BLOOD PRESSURE: 126 MMHG | DIASTOLIC BLOOD PRESSURE: 66 MMHG | HEIGHT: 65 IN

## 2024-04-01 DIAGNOSIS — M25.562 PAIN IN LEFT KNEE: ICD-10-CM

## 2024-04-01 PROCEDURE — G2211 COMPLEX E/M VISIT ADD ON: CPT | Mod: NC,1L

## 2024-04-01 PROCEDURE — 99214 OFFICE O/P EST MOD 30 MIN: CPT | Mod: 25

## 2024-04-01 PROCEDURE — 99401 PREV MED CNSL INDIV APPRX 15: CPT | Mod: GY

## 2024-04-01 RX ORDER — METHYLPREDNISOLONE 4 MG/1
4 TABLET ORAL
Qty: 1 | Refills: 0 | Status: ACTIVE | COMMUNITY
Start: 2024-04-01 | End: 1900-01-01

## 2024-04-01 NOTE — HISTORY OF PRESENT ILLNESS
[Spouse] : spouse [de-identified] : 67 y/o M presents for f/u, GHM  cc: pain on left knee, and right shoulder pain. Meds were given for right shoulder pain but patient states they don't seem to help anymore.  Voices no further complaints. ROS as documented below. Denies fever, cough, chills, body aches and SOB.   TY Nash, am scribing for and in the presence of Dr. Guerrero, the following sections of:  HISTORY OF PRESENT ILLNESS, PAST MEDICAL/FAMILY/SOCIAL HISTORY, ROS, VITALS, PE, DISPOSITION [FreeTextEntry1] : Knee pain/shoulder pain/general health may

## 2024-04-01 NOTE — REVIEW OF SYSTEMS
[Sore Throat] : sore throat [Cough] : cough [Negative] : Heme/Lymph [Nasal Discharge] : no nasal discharge [Joint Pain] : joint pain [Joint Stiffness] : joint stiffness [FreeTextEntry9] : Left knee pain right shoulder pain

## 2024-04-01 NOTE — PHYSICAL EXAM
[No Acute Distress] : no acute distress [Well Nourished] : well nourished [Well-Appearing] : well-appearing [Well Developed] : well developed [Normal Sclera/Conjunctiva] : normal sclera/conjunctiva [PERRL] : pupils equal round and reactive to light [EOMI] : extraocular movements intact [No JVD] : no jugular venous distention [Normal Outer Ear/Nose] : the outer ears and nose were normal in appearance [Supple] : supple [No Lymphadenopathy] : no lymphadenopathy [Thyroid Normal, No Nodules] : the thyroid was normal and there were no nodules present [No Respiratory Distress] : no respiratory distress  [No Accessory Muscle Use] : no accessory muscle use [Clear to Auscultation] : lungs were clear to auscultation bilaterally [Regular Rhythm] : with a regular rhythm [Normal Rate] : normal rate  [No Murmur] : no murmur heard [Normal S1, S2] : normal S1 and S2 [No Carotid Bruits] : no carotid bruits [No Varicosities] : no varicosities [No Abdominal Bruit] : a ~M bruit was not heard ~T in the abdomen [No Edema] : there was no peripheral edema [Pedal Pulses Present] : the pedal pulses are present [No Extremity Clubbing/Cyanosis] : no extremity clubbing/cyanosis [No Palpable Aorta] : no palpable aorta [Soft] : abdomen soft [Non Tender] : non-tender [No Masses] : no abdominal mass palpated [Non-distended] : non-distended [Normal Bowel Sounds] : normal bowel sounds [No HSM] : no HSM [Normal Posterior Cervical Nodes] : no posterior cervical lymphadenopathy [Normal Anterior Cervical Nodes] : no anterior cervical lymphadenopathy [No CVA Tenderness] : no CVA  tenderness [No Spinal Tenderness] : no spinal tenderness [No Joint Swelling] : no joint swelling [No Rash] : no rash [Grossly Normal Strength/Tone] : grossly normal strength/tone [Coordination Grossly Intact] : coordination grossly intact [Normal Gait] : normal gait [No Focal Deficits] : no focal deficits [Normal Affect] : the affect was normal [Deep Tendon Reflexes (DTR)] : deep tendon reflexes were 2+ and symmetric [Normal Insight/Judgement] : insight and judgment were intact [Normal] : affect was normal and insight and judgment were intact [de-identified] : BMI 30 point [de-identified] : Right shoulder pain and stiffness -persists Loya positive cross chest positive.  Medial pain left knee crepitations

## 2024-04-01 NOTE — HEALTH RISK ASSESSMENT
[No] : No [No falls in past year] : Patient reported no falls in the past year [Patient refused screening] : Patient refused screening [0] : 2) Feeling down, depressed, or hopeless: Not at all (0) [PHQ-2 Negative - No further assessment needed] : PHQ-2 Negative - No further assessment needed [Reviewed no changes] : Reviewed, no changes [I will adhere to the patient's wishes.] : I will adhere to the patient's wishes. [Good] : ~his/her~  mood as  good [de-identified] : Minimal [Change in mental status noted] : No change in mental status noted [Language] : denies difficulty with language [Behavior] : denies difficulty with behavior [Learning/Retaining New Information] : denies difficulty learning/retaining new information [Handling Complex Tasks] : denies difficulty handling complex tasks [Reasoning] : denies difficulty with reasoning [Spatial Ability and Orientation] : denies difficulty with spatial ability and orientation [With Significant Other] : lives with significant other [Retired] : retired [Less Than High School] : less than high school [] :  [# Of Children ___] : has [unfilled] children [Sexually Active] : sexually active [Fully functional (bathing, dressing, toileting, transferring, walking, feeding)] : Fully functional (bathing, dressing, toileting, transferring, walking, feeding) [Fully functional (using the telephone, shopping, preparing meals, housekeeping, doing laundry, using] : Fully functional and needs no help or supervision to perform IADLs (using the telephone, shopping, preparing meals, housekeeping, doing laundry, using transportation, managing medications and managing finances) [Reports changes in hearing] : Reports no changes in hearing [Reports changes in vision] : Reports no changes in vision [Reports normal functional visual acuity (ie: able to read med bottle)] : Reports normal functional visual acuity

## 2024-04-01 NOTE — COUNSELING
[Fall prevention counseling provided] : Fall prevention counseling provided [Adequate lighting] : Adequate lighting [No throw rugs] : No throw rugs [Use proper foot wear] : Use proper foot wear [Potential consequences of obesity discussed] : Potential consequences of obesity discussed [Benefits of weight loss discussed] : Benefits of weight loss discussed [Structured Weight Management Program suggested:] : Structured weight management program suggested [Encouraged to increase physical activity] : Encouraged to increase physical activity [Encouraged to maintain food diary] : Encouraged to maintain food diary [Target Wt Loss Goal ___] : Weight Loss Goals: Target weight loss goal [unfilled] lbs [Encouraged to use exercise tracking device] : Encouraged to use exercise tracking device [Weigh Self Weekly] : weigh self weekly [Decrease Portions] : decrease portions [____ min/wk Activity] : [unfilled] min/wk activity [Keep Food Diary] : keep food diary [None] : None [Good understanding] : Patient has a good understanding of lifestyle changes and steps needed to achieve self management goal [de-identified] : Total face-to-face time with patient - 15 minutes; >50% involved counselling, review of labs/tests, and/or coordination of medical care: take medications as advised. Rest, ice/heat massage therapy/myofascial  release no heavy lift or twisting avoid prolonged positioning. We reviewed proper lifting mechanics continue with a home stretching program. Return to office in 2 weeks   Avoid sweets and sugary drinks, diet drinks or water preferred, minimize carbohydrates such as bread, rice, pasta and potatoes - Discussed diabetes physiology - Discussed importance of monitoring blood glucose levels - Encouraged a low fat/low cholesterol diet - Discussed symptoms of hyperglycemia and hypoglycemia - Discussed ADA glucose goals - Discussed  HGB A1c and the effects of blood glucose on the level - Discussed Healthy eating, avoidance of concentrated sweets, and to include vegetables by at least 2 meals a day - Discussed regular exercise - Discussed importance of follow up physician visits Bp stable, continue with medications, dash diet, exercise, and dietary management.Continue to check home Bps. Dscd.D/E wt.loss needs to loose 10% TBW.DSCD.self monitoring, goal setting,problem solving w-b mod.portion control, avoidence of skipping meals, high glycemic foods,snacking and mindless eating in front of the tv.Suggested use of small plates and not keepingall of the food on the dinner table and leaving it at the stove top.Pt was also told to calorie count and an arik was recommended DSCD exercising 20 minutes per day:dscd:med /pharmaco bariatric tx options.   - Encouraged a low fat/low cholesterol diet  - Discussed Healthy eating, avoidance of concentrated sweets, and to include vegetables by at least 3 meals a day  - encouraged low glycemic fruits, grains and vegetables and a diet high in plant protein  - Discussed regular exercise  - Discussed importance of follow up physician visits    low chol diet. Avoid fried foods, red meat, butter, eggs, hard cheeses. Use canola or olive oil preferred.   - Encouraged a low fat/low cholesterol diet  - Discussed Healthy eating, avoidance of concentrated sweets, and to include vegetables by at least 3 meals a day  - encouraged low glycemic fruits, grains and vegetables and a diet high in plant protein  - Discussed regular exercise  - Discussed importance of follow up physician visits  Bp stable, continue with medications, dash diet, exercise, and dietary management.Continue to check home Bps.

## 2024-04-18 RX ORDER — LISINOPRIL 10 MG/1
10 TABLET ORAL
Qty: 90 | Refills: 0 | Status: ACTIVE | COMMUNITY
Start: 2022-03-29 | End: 1900-01-01

## 2024-04-25 RX ORDER — ATORVASTATIN CALCIUM 40 MG/1
40 TABLET, FILM COATED ORAL
Qty: 90 | Refills: 3 | Status: ACTIVE | COMMUNITY
Start: 2017-11-15 | End: 1900-01-01

## 2024-05-01 RX ORDER — METFORMIN HYDROCHLORIDE 1000 MG/1
1000 TABLET, COATED ORAL
Qty: 180 | Refills: 1 | Status: ACTIVE | COMMUNITY
Start: 2021-07-27 | End: 1900-01-01

## 2024-05-08 ENCOUNTER — APPOINTMENT (OUTPATIENT)
Dept: INTERNAL MEDICINE | Facility: CLINIC | Age: 69
End: 2024-05-08

## 2024-06-02 PROBLEM — Z13.39 ALCOHOL SCREENING: Status: ACTIVE | Noted: 2021-12-14

## 2024-06-02 PROBLEM — Z71.89 ADVANCED CARE PLANNING/COUNSELING DISCUSSION: Status: ACTIVE | Noted: 2021-12-14

## 2024-06-03 ENCOUNTER — APPOINTMENT (OUTPATIENT)
Dept: INTERNAL MEDICINE | Facility: CLINIC | Age: 69
End: 2024-06-03
Payer: MEDICARE

## 2024-06-03 ENCOUNTER — NON-APPOINTMENT (OUTPATIENT)
Age: 69
End: 2024-06-03

## 2024-06-03 VITALS
OXYGEN SATURATION: 96 % | SYSTOLIC BLOOD PRESSURE: 122 MMHG | RESPIRATION RATE: 16 BRPM | HEART RATE: 76 BPM | TEMPERATURE: 96.5 F | BODY MASS INDEX: 29.66 KG/M2 | DIASTOLIC BLOOD PRESSURE: 78 MMHG | HEIGHT: 65 IN | WEIGHT: 178 LBS

## 2024-06-03 DIAGNOSIS — Z71.89 OTHER SPECIFIED COUNSELING: ICD-10-CM

## 2024-06-03 DIAGNOSIS — I10 ESSENTIAL (PRIMARY) HYPERTENSION: ICD-10-CM

## 2024-06-03 DIAGNOSIS — E11.9 TYPE 2 DIABETES MELLITUS W/OUT COMPLICATIONS: ICD-10-CM

## 2024-06-03 DIAGNOSIS — R94.31 ABNORMAL ELECTROCARDIOGRAM [ECG] [EKG]: ICD-10-CM

## 2024-06-03 DIAGNOSIS — N40.0 BENIGN PROSTATIC HYPERPLASIA WITHOUT LOWER URINARY TRACT SYMPMS: ICD-10-CM

## 2024-06-03 DIAGNOSIS — Z13.31 ENCOUNTER FOR SCREENING FOR DEPRESSION: ICD-10-CM

## 2024-06-03 DIAGNOSIS — Z13.39 ENCOUNTER FOR SCREENING EXAM FOR OTHER MENTAL HEALTH AND BEHAVIORAL DISORDERS: ICD-10-CM

## 2024-06-03 DIAGNOSIS — H91.90 UNSPECIFIED HEARING LOSS, UNSPECIFIED EAR: ICD-10-CM

## 2024-06-03 DIAGNOSIS — M25.511 PAIN IN RIGHT SHOULDER: ICD-10-CM

## 2024-06-03 DIAGNOSIS — Z00.00 ENCOUNTER FOR GENERAL ADULT MEDICAL EXAMINATION W/OUT ABNORMAL FINDINGS: ICD-10-CM

## 2024-06-03 DIAGNOSIS — I25.10 ATHEROSCLEROTIC HEART DISEASE OF NATIVE CORONARY ARTERY W/OUT ANGINA PECTORIS: ICD-10-CM

## 2024-06-03 DIAGNOSIS — E78.5 HYPERLIPIDEMIA, UNSPECIFIED: ICD-10-CM

## 2024-06-03 DIAGNOSIS — E55.9 VITAMIN D DEFICIENCY, UNSPECIFIED: ICD-10-CM

## 2024-06-03 PROCEDURE — G0444 DEPRESSION SCREEN ANNUAL: CPT | Mod: 59

## 2024-06-03 PROCEDURE — 99497 ADVNCD CARE PLAN 30 MIN: CPT | Mod: 25

## 2024-06-03 PROCEDURE — 99215 OFFICE O/P EST HI 40 MIN: CPT | Mod: 25

## 2024-06-03 PROCEDURE — 93000 ELECTROCARDIOGRAM COMPLETE: CPT | Mod: 59

## 2024-06-03 PROCEDURE — G0439: CPT

## 2024-06-03 PROCEDURE — 20610 DRAIN/INJ JOINT/BURSA W/O US: CPT

## 2024-06-03 RX ORDER — LIDOCAINE HYDROCHLORIDE 10 MG/ML
1 INJECTION, SOLUTION INFILTRATION; PERINEURAL
Refills: 0 | Status: COMPLETED | OUTPATIENT
Start: 2024-06-03

## 2024-06-03 RX ORDER — TRIAMCINOLONE ACETONIDE 40 MG/ML
40 SUSPENSION INTRA-ARTERIAL; INTRAMUSCULAR
Qty: 1 | Refills: 0 | Status: COMPLETED | OUTPATIENT
Start: 2024-06-03

## 2024-06-03 RX ORDER — DICLOFENAC SODIUM 75 MG/1
75 TABLET, DELAYED RELEASE ORAL
Qty: 30 | Refills: 2 | Status: ACTIVE | COMMUNITY
Start: 2023-11-20 | End: 1900-01-01

## 2024-06-03 RX ADMIN — TRIAMCINOLONE ACETONIDE 40 MG/ML: 40 INJECTION, SUSPENSION INTRA-ARTICULAR; INTRAMUSCULAR at 00:00

## 2024-06-03 RX ADMIN — LIDOCAINE HYDROCHLORIDE 0 %: 10 INJECTION, SOLUTION EPIDURAL; INFILTRATION; INTRACAUDAL; PERINEURAL at 00:00

## 2024-06-04 PROBLEM — N40.0 BPH (BENIGN PROSTATIC HYPERPLASIA): Status: ACTIVE | Noted: 2021-06-28

## 2024-06-04 PROBLEM — M25.511 RIGHT SHOULDER PAIN, UNSPECIFIED CHRONICITY: Status: ACTIVE | Noted: 2023-11-20

## 2024-06-04 PROBLEM — I10 ESSENTIAL HYPERTENSION: Status: ACTIVE | Noted: 2017-10-26

## 2024-06-04 PROBLEM — Z13.31 DEPRESSION SCREENING: Status: ACTIVE | Noted: 2021-12-14

## 2024-06-04 PROBLEM — I25.10 CAD IN NATIVE ARTERY: Status: ACTIVE | Noted: 2017-10-26

## 2024-06-04 PROBLEM — R94.31 ABNORMAL EKG: Status: ACTIVE | Noted: 2021-06-28

## 2024-06-04 PROBLEM — E78.5 HYPERLIPIDEMIA: Status: ACTIVE | Noted: 2017-10-26

## 2024-06-04 LAB
25(OH)D3 SERPL-MCNC: 39.6 NG/ML
ALBUMIN SERPL ELPH-MCNC: 4.5 G/DL
ALP BLD-CCNC: 116 U/L
ALT SERPL-CCNC: 28 U/L
ANION GAP SERPL CALC-SCNC: 11 MMOL/L
APPEARANCE: CLEAR
AST SERPL-CCNC: 19 U/L
BASOPHILS # BLD AUTO: 0.06 K/UL
BASOPHILS NFR BLD AUTO: 0.9 %
BILIRUB SERPL-MCNC: 0.4 MG/DL
BILIRUBIN URINE: NEGATIVE
BLOOD URINE: NEGATIVE
BUN SERPL-MCNC: 24 MG/DL
CALCIUM SERPL-MCNC: 10.2 MG/DL
CHLORIDE SERPL-SCNC: 102 MMOL/L
CHOLEST SERPL-MCNC: 124 MG/DL
CO2 SERPL-SCNC: 25 MMOL/L
COLOR: YELLOW
CREAT SERPL-MCNC: 1.14 MG/DL
EGFR: 69 ML/MIN/1.73M2
EOSINOPHIL # BLD AUTO: 0.26 K/UL
EOSINOPHIL NFR BLD AUTO: 4.1 %
ESTIMATED AVERAGE GLUCOSE: 154 MG/DL
GGT SERPL-CCNC: 22 U/L
GLUCOSE QUALITATIVE U: NEGATIVE MG/DL
GLUCOSE SERPL-MCNC: 127 MG/DL
HBA1C MFR BLD HPLC: 7 %
HCT VFR BLD CALC: 39.7 %
HDLC SERPL-MCNC: 43 MG/DL
HGB BLD-MCNC: 12.8 G/DL
IMM GRANULOCYTES NFR BLD AUTO: 0.2 %
KETONES URINE: NEGATIVE MG/DL
LDLC SERPL CALC-MCNC: 57 MG/DL
LEUKOCYTE ESTERASE URINE: NEGATIVE
LYMPHOCYTES # BLD AUTO: 1.77 K/UL
LYMPHOCYTES NFR BLD AUTO: 28 %
MAN DIFF?: NORMAL
MCHC RBC-ENTMCNC: 30 PG
MCHC RBC-ENTMCNC: 32.2 GM/DL
MCV RBC AUTO: 93.2 FL
MONOCYTES # BLD AUTO: 0.36 K/UL
MONOCYTES NFR BLD AUTO: 5.7 %
NEUTROPHILS # BLD AUTO: 3.87 K/UL
NEUTROPHILS NFR BLD AUTO: 61.1 %
NITRITE URINE: NEGATIVE
NONHDLC SERPL-MCNC: 81 MG/DL
PH URINE: 6.5
PLATELET # BLD AUTO: 159 K/UL
POTASSIUM SERPL-SCNC: 5.1 MMOL/L
PROT SERPL-MCNC: 6.8 G/DL
PROTEIN URINE: NEGATIVE MG/DL
PSA SERPL-MCNC: 0.45 NG/ML
RBC # BLD: 4.26 M/UL
RBC # FLD: 13.6 %
SODIUM SERPL-SCNC: 137 MMOL/L
SPECIFIC GRAVITY URINE: 1.01
TRIGL SERPL-MCNC: 134 MG/DL
TSH SERPL-ACNC: 1.99 UIU/ML
UROBILINOGEN URINE: 0.2 MG/DL
WBC # FLD AUTO: 6.33 K/UL

## 2024-06-04 NOTE — COUNSELING
[Fall prevention counseling provided] : Fall prevention counseling provided [Adequate lighting] : Adequate lighting [No throw rugs] : No throw rugs [Use proper foot wear] : Use proper foot wear [Use recommended devices] : Use recommended devices [Behavioral health counseling provided] : Behavioral health counseling provided [Sleep ___ hours/day] : Sleep [unfilled] hours/day [Engage in a relaxing activity] : Engage in a relaxing activity [Plan in advance] : Plan in advance [None] : None [Potential consequences of obesity discussed] : Potential consequences of obesity discussed [Benefits of weight loss discussed] : Benefits of weight loss discussed [Structured Weight Management Program suggested:] : Structured weight management program suggested [Encouraged to maintain food diary] : Encouraged to maintain food diary [Encouraged to increase physical activity] : Encouraged to increase physical activity [Target Wt Loss Goal ___] : Weight Loss Goals: Target weight loss goal [unfilled] lbs [Weigh Self Weekly] : weigh self weekly [Decrease Portions] : decrease portions [____ min/wk Activity] : [unfilled] min/wk activity [Good understanding] : Patient has a good understanding of disease, goals and obesity follow-up plan [FreeTextEntry4] : 10 [de-identified] : Medical Annual wellness visit completed:\par  HRA completed and reviewed with patient\par  Medical, family, surgical history reviewed with patient and updated\par  List of current providers r/w patient and updated\par  Vitals, BMI reviewed and discussed along with healthy BMI goals. Dietary counseling x 15 minutes provided\par  Depression PHQ 9 completed and reviewed \par  Annual safety assessment reviewed\par  discussed advanced directives\par  smoking cessation counseling provided\par  Established routine screening and immunization schedules\par  \par  VACCINATION & OTHER TX RECOMMENDATIONS\par  \par  ASA preventative therapy\par  Calcium/Vitamin D supplementation\par   \par  Dietary counseling, nutrition referral\par  risks vs. benefits d/w patient. routine vaccination and vaccination schedules and recommendation d/w patient\par  \par  Vaccines recommended: \par  * pneumovax (once after 65) & prevnar\par  * annual Influenza vaccine\par  * Hep B vaccines\par  * zostavax\par  * Tdap\par  \par  Colorectal screening recommended; screening colonoscopy q10yr, flex sig q5yr, annual fecal occult testing\par  BMD recommended biennially for osteoporosis screening\par  Glaucoma screening recommended, annual optho evals\par  Cardiovascular screening and blood tests recommended and discussed w/ patient, cholesterol screening and dietary counseling\par  AAA recommended x 1\par  \par  Met with SHARON CARMICHAEL, who was willing to discuss advance care planning. \par  Our advance care planning conversation included a discussion about:\par  1. The value and importance of advance care planning.\par  2. Experiences with loved ones who have been seriously ill or have .\par  3. Exploration of personal, cultural, or spiritual beliefs that might influence medical decisions.\par  4. Exploration of goals of care in the event of a sudden injury or illness.\par  5. Identification of a health care agent.\par  6. Review and update, or completion of, an advance directive.\par  Start time: ___1000am_________                    End time: ___1015am_________\par  \par  diet and exercise weight loss.  Low-salt low-fat ADA diet/ htn- Discussed diabetes physiology\par  - Discussed importance of monitoring blood glucose levels\par  - Encouraged a low fat/low cholesterol diet\par  - Discussed symptoms of hyperglycemia and hypoglycemia\par  - Discussed ADA glucose goals\par  - Discussed  HGB A1c and the effects of blood glucose on the level\par  - Discussed Healthy eating, avoidance of concentrated sweets, and to include vegetables by at least 2 meals a day\par  - Discussed regular exercise\par  - Discussed importance of follow up physician visits Limit intake of Sodium (Salt) to less than 2 grams a day to prevent fluid retention-swelling or worsening of symptoms. The importance of keeping the blood pressure at or below 130/80 to prevent stroke, heart attacks, kidney failure, blindness, and loss of limbs was  low chol diet. Avoid fried foods, red meat, butter, eggs, hard cheeses. Use canola or olive oil preferred. ::  was established in which goals would be set, monitoring would be done, and problem solving would also be addressed. The patient would be assisted using behavior change techniques, such as self-help and counseling through behavioral modification: Problem solving using hypnosis and positive medical reinforcement to achieve agreed-upon goals.\par  \par  Symptomatic patients : Test for influenza, if positive, treat for influenza and do not continue below. \par  1. Fever plus cough or shortness of breath : Test for RVP and COVID-19.\par  2.Indirect, circumstantial or unclear exposure to COVID-19, or other concerning cases not meeting above criteria: Please call [facility] to discuss testing. \par  +++ All above cases must be reported to the [facility] registry. +++\par  \par  Asymptomatic patients: \par  1. Known first-degree direct-contact exposure to positive COVID-19 patient but asymptomatic: No testing PLUS 14 day self-quarantine. Pt to call if symptoms develop. Report to [facility].\par  2. No known exposure and asymptomatic, referred from outside healthcare organization: Please call [facility] to discuss testing. \par  3.All other asymptomatic patients with no known exposures: no testing, no exceptions.\par  \par  \par

## 2024-06-04 NOTE — HISTORY OF PRESENT ILLNESS
[FreeTextEntry1] : F/U,[facility] and an Annual Wellness. Hx of diabetes,htn,CAD,obesity,MI,stents and vit d def.  CC: Patient is here today for an Annual Wellness and Blood work. Patient complaints about having right shoulder pain for about 2-3 years and has been taking pain medication for it which has been helping him a bit. Patient also states that he has been feeling dizzy when laying down and getting up from the bed or couch and has been like this for about a week already. Patient also needs a refills on his medication.  [de-identified] : 67 y/o M presents for f/u, [facility]. Hx of diabetes, htn, CAD, Obesity, MI, stents and vit d def.  Patient denies fever chills cough chest pain or shortness of breath Patient is here today for an Annual Wellness and Blood work. Patient complaints about having right shoulder pain for about 2-3 years and has been taking pain medication for it which has been helping him a bit. Patient also states that he has been feeling dizzy when laying down and getting up from the bed or couch and has been like this for about a week already. Patient also needs a refills on his medication.  Of note, received both doses of the Pfizer vaccine. Otherwise patient is stable.   Voices no further complaints. ROS as documented below. Denies fever,cough,chills,body aches and SOB.   I Lucy Cade, am scribing for and in the presence of Dr. Guerrero, the following sections of:  HISTORY OF PRESENT ILLNESS, PAST MEDICAL/FAMILY/SOCIAL HISTORY, ROS, VITALS, PE, DISPOSITION

## 2024-06-04 NOTE — REASON FOR VISIT
[Annual Wellness Visit] : an annual wellness visit [FreeTextEntry1] : Annual wellness/general health maintenance for this 68-year-old male

## 2024-06-04 NOTE — HEALTH RISK ASSESSMENT
[Good] : ~his/her~  mood as  good [Yes] : Yes [2 - 4 times a month (2 pts)] : 2-4 times a month (2 points) [1 or 2 (0 pts)] : 1 or 2 (0 points) [Never (0 pts)] : Never (0 points) [No falls in past year] : Patient reported no falls in the past year [No] : In the past 12 months have you used drugs other than those required for medical reasons? No [Little interest or pleasure doing things] : 1) Little interest or pleasure doing things [Feeling down, depressed, or hopeless] : 2) Feeling down, depressed, or hopeless [0] : 2) Feeling down, depressed, or hopeless: Not at all (0) [PHQ-2 Negative - No further assessment needed] : PHQ-2 Negative - No further assessment needed [I have developed a follow-up plan documented below in the note.] : I have developed a follow-up plan documented below in the note. [Time Spent: ___ Minutes] : I spent [unfilled] minutes performing a depression screening for this patient. [Never] : Never [None] : None [With Significant Other] : lives with significant other [Employed] : employed [] :  [Sexually Active] : sexually active [Feels Safe at Home] : Feels safe at home [Fully functional (bathing, dressing, toileting, transferring, walking, feeding)] : Fully functional (bathing, dressing, toileting, transferring, walking, feeding) [Fully functional (using the telephone, shopping, preparing meals, housekeeping, doing laundry, using] : Fully functional and needs no help or supervision to perform IADLs (using the telephone, shopping, preparing meals, housekeeping, doing laundry, using transportation, managing medications and managing finances) [Smoke Detector] : smoke detector [Carbon Monoxide Detector] : carbon monoxide detector [Safety elements used in home] : safety elements used in home [Seat Belt] :  uses seat belt [Sunscreen] : uses sunscreen [Reviewed no changes] : Reviewed, no changes [I will adhere to the patient's wishes.] : I will adhere to the patient's wishes. [Time Spent: ___ minutes] : Time Spent: [unfilled] minutes [Fair] :  ~his/her~ mood as fair [Designated Healthcare Proxy] : Designated healthcare proxy [Relationship: ___] : Relationship: [unfilled] [Aggressive treatment] : aggressive treatment [Last Verification Date: ___] : Last Verification Date: [unfilled] [Patient reported colonoscopy was normal] : Patient reported colonoscopy was normal [With Family] : lives with family [Reports changes in hearing] : Reports changes in hearing [Name: ___] : Health Care Proxy's Name: [unfilled]  [de-identified] : Cardiology [Audit-CScore] : 0 [de-identified] : Light [de-identified] : Standard [de-identified] :  Time Spent: Greater than 5 minutes [[id number]] : 0 [Change in mental status noted] : No change in mental status noted [Language] : denies difficulty with language [Behavior] : denies difficulty with behavior [Learning/Retaining New Information] : denies difficulty learning/retaining new information [Handling Complex Tasks] : denies difficulty handling complex tasks [Reasoning] : denies difficulty with reasoning [Spatial Ability and Orientation] : denies difficulty with spatial ability and orientation [High Risk Behavior] : no high risk behavior [Reports changes in vision] : Reports no changes in vision [Reports normal functional visual acuity (ie: able to read med bottle)] : Reports poor functional visual acuity.  [Reports changes in dental health] : Reports no changes in dental health [Guns at Home] : no guns at home [Travel to Developing Areas] : does not  travel to developing areas [TB Exposure] : is not being exposed to tuberculosis [BoneDensityComments] : NOT UTD  [ColonoscopyDate] : 01/2021 [FreeTextEntry2] :  [AdvancecareDate] : 06/2024

## 2024-06-04 NOTE — END OF VISIT
[FreeTextEntry4] : I Kyle Melendrez, am scribing for and in the presence of Dr. Guerrero, the following sections of:  HISTORY OF PRESENT ILLNESS, PAST MEDICAL/FAMILY/SOCIAL HISTORY, ROS, VITALS, PE, DISPOSITION [Time Spent: ___ minutes] : I have spent [unfilled] minutes of time on the encounter.

## 2024-06-04 NOTE — REVIEW OF SYSTEMS
[Negative] : Heme/Lymph [Joint Pain] : joint pain [Muscle Pain] : muscle pain [Dizziness] : dizziness [Hearing Loss] : hearing loss [Back Pain] : no back pain [FreeTextEntry9] : Right shoulder pain

## 2024-10-30 ENCOUNTER — APPOINTMENT (OUTPATIENT)
Dept: INTERNAL MEDICINE | Facility: CLINIC | Age: 69
End: 2024-10-30

## 2024-11-13 ENCOUNTER — APPOINTMENT (OUTPATIENT)
Dept: INTERNAL MEDICINE | Facility: CLINIC | Age: 69
End: 2024-11-13
Payer: MEDICARE

## 2024-11-13 PROCEDURE — 90662 IIV NO PRSV INCREASED AG IM: CPT

## 2024-11-13 PROCEDURE — G0008: CPT

## 2024-11-21 ENCOUNTER — APPOINTMENT (OUTPATIENT)
Dept: INTERNAL MEDICINE | Facility: CLINIC | Age: 69
End: 2024-11-21
Payer: MEDICARE

## 2024-11-21 VITALS
OXYGEN SATURATION: 96 % | WEIGHT: 171 LBS | TEMPERATURE: 98.1 F | BODY MASS INDEX: 28.49 KG/M2 | SYSTOLIC BLOOD PRESSURE: 106 MMHG | HEART RATE: 64 BPM | HEIGHT: 65 IN | DIASTOLIC BLOOD PRESSURE: 60 MMHG | RESPIRATION RATE: 17 BRPM

## 2024-11-21 DIAGNOSIS — Z23 ENCOUNTER FOR IMMUNIZATION: ICD-10-CM

## 2024-11-21 DIAGNOSIS — E66.9 OBESITY, UNSPECIFIED: ICD-10-CM

## 2024-11-21 DIAGNOSIS — M17.0 BILATERAL PRIMARY OSTEOARTHRITIS OF KNEE: ICD-10-CM

## 2024-11-21 DIAGNOSIS — I25.2 OLD MYOCARDIAL INFARCTION: ICD-10-CM

## 2024-11-21 DIAGNOSIS — E55.9 VITAMIN D DEFICIENCY, UNSPECIFIED: ICD-10-CM

## 2024-11-21 DIAGNOSIS — E11.9 TYPE 2 DIABETES MELLITUS W/OUT COMPLICATIONS: ICD-10-CM

## 2024-11-21 DIAGNOSIS — I10 ESSENTIAL (PRIMARY) HYPERTENSION: ICD-10-CM

## 2024-11-21 DIAGNOSIS — E78.5 HYPERLIPIDEMIA, UNSPECIFIED: ICD-10-CM

## 2024-11-21 PROCEDURE — 99214 OFFICE O/P EST MOD 30 MIN: CPT | Mod: 25

## 2024-11-21 PROCEDURE — G0009: CPT

## 2024-11-21 PROCEDURE — 90677 PCV20 VACCINE IM: CPT

## 2024-11-27 DIAGNOSIS — M25.511 PAIN IN RIGHT SHOULDER: ICD-10-CM

## 2024-12-03 ENCOUNTER — RX RENEWAL (OUTPATIENT)
Age: 69
End: 2024-12-03

## 2024-12-19 ENCOUNTER — APPOINTMENT (OUTPATIENT)
Dept: INTERNAL MEDICINE | Facility: CLINIC | Age: 69
End: 2024-12-19
Payer: MEDICARE

## 2024-12-19 VITALS
WEIGHT: 171 LBS | BODY MASS INDEX: 28.49 KG/M2 | HEART RATE: 71 BPM | RESPIRATION RATE: 17 BRPM | SYSTOLIC BLOOD PRESSURE: 112 MMHG | HEIGHT: 65 IN | DIASTOLIC BLOOD PRESSURE: 70 MMHG | TEMPERATURE: 98.2 F | OXYGEN SATURATION: 97 %

## 2024-12-19 DIAGNOSIS — R09.89 OTHER SPECIFIED SYMPTOMS AND SIGNS INVOLVING THE CIRCULATORY AND RESPIRATORY SYSTEMS: ICD-10-CM

## 2024-12-19 DIAGNOSIS — E11.9 TYPE 2 DIABETES MELLITUS W/OUT COMPLICATIONS: ICD-10-CM

## 2024-12-19 DIAGNOSIS — E78.5 HYPERLIPIDEMIA, UNSPECIFIED: ICD-10-CM

## 2024-12-19 DIAGNOSIS — E66.9 OBESITY, UNSPECIFIED: ICD-10-CM

## 2024-12-19 DIAGNOSIS — I25.10 ATHEROSCLEROTIC HEART DISEASE OF NATIVE CORONARY ARTERY W/OUT ANGINA PECTORIS: ICD-10-CM

## 2024-12-19 DIAGNOSIS — I10 ESSENTIAL (PRIMARY) HYPERTENSION: ICD-10-CM

## 2024-12-19 PROCEDURE — G0447 BEHAVIOR COUNSEL OBESITY 15M: CPT | Mod: 59

## 2024-12-19 PROCEDURE — 87811 SARS-COV-2 COVID19 W/OPTIC: CPT | Mod: QW

## 2024-12-19 PROCEDURE — 87804 INFLUENZA ASSAY W/OPTIC: CPT | Mod: 59,QW

## 2024-12-19 PROCEDURE — 99214 OFFICE O/P EST MOD 30 MIN: CPT | Mod: 25

## 2024-12-19 RX ORDER — LEVOCETIRIZINE DIHYDROCHLORIDE 5 MG/1
5 TABLET ORAL
Qty: 30 | Refills: 0 | Status: ACTIVE | COMMUNITY
Start: 2024-12-19 | End: 1900-01-01

## 2024-12-19 RX ORDER — GUAIFENESIN 600 MG/1
600 TABLET, EXTENDED RELEASE ORAL TWICE DAILY
Qty: 30 | Refills: 0 | Status: DISCONTINUED | COMMUNITY
Start: 2024-12-19 | End: 2024-12-19

## 2024-12-22 LAB
FLUAV SPEC QL CULT: NEGATIVE
FLUBV AG SPEC QL IA: NEGATIVE
RAPID RVP RESULT: NOT DETECTED
SARS-COV-2 AG RESP QL IA.RAPID: NEGATIVE
SARS-COV-2 RNA RESP QL NAA+PROBE: NOT DETECTED

## 2025-03-07 ENCOUNTER — RX RENEWAL (OUTPATIENT)
Age: 70
End: 2025-03-07

## 2025-03-13 ENCOUNTER — RX RENEWAL (OUTPATIENT)
Age: 70
End: 2025-03-13

## 2025-03-17 ENCOUNTER — RX RENEWAL (OUTPATIENT)
Age: 70
End: 2025-03-17

## 2025-04-22 ENCOUNTER — APPOINTMENT (OUTPATIENT)
Dept: INTERNAL MEDICINE | Facility: CLINIC | Age: 70
End: 2025-04-22
Payer: MEDICARE

## 2025-04-22 VITALS
BODY MASS INDEX: 29.49 KG/M2 | TEMPERATURE: 97.7 F | HEART RATE: 80 BPM | WEIGHT: 177 LBS | DIASTOLIC BLOOD PRESSURE: 70 MMHG | HEIGHT: 65 IN | RESPIRATION RATE: 17 BRPM | OXYGEN SATURATION: 97 % | SYSTOLIC BLOOD PRESSURE: 116 MMHG

## 2025-04-22 DIAGNOSIS — I11.9 HYPERTENSIVE HEART DISEASE W/OUT HEART FAILURE: ICD-10-CM

## 2025-04-22 DIAGNOSIS — I10 ESSENTIAL (PRIMARY) HYPERTENSION: ICD-10-CM

## 2025-04-22 DIAGNOSIS — E78.5 HYPERLIPIDEMIA, UNSPECIFIED: ICD-10-CM

## 2025-04-22 DIAGNOSIS — N40.0 BENIGN PROSTATIC HYPERPLASIA WITHOUT LOWER URINARY TRACT SYMPMS: ICD-10-CM

## 2025-04-22 DIAGNOSIS — E11.9 TYPE 2 DIABETES MELLITUS W/OUT COMPLICATIONS: ICD-10-CM

## 2025-04-22 LAB — HBA1C MFR BLD HPLC: 6.8

## 2025-04-22 PROCEDURE — 83036 HEMOGLOBIN GLYCOSYLATED A1C: CPT | Mod: QW

## 2025-04-22 PROCEDURE — 99214 OFFICE O/P EST MOD 30 MIN: CPT | Mod: 25

## 2025-04-28 LAB
ALBUMIN SERPL ELPH-MCNC: 4.4 G/DL
ALP BLD-CCNC: 113 U/L
ALT SERPL-CCNC: 18 U/L
ANION GAP SERPL CALC-SCNC: 12 MMOL/L
AST SERPL-CCNC: 18 U/L
BILIRUB SERPL-MCNC: 0.4 MG/DL
BUN SERPL-MCNC: 29 MG/DL
CALCIUM SERPL-MCNC: 9.9 MG/DL
CHLORIDE SERPL-SCNC: 109 MMOL/L
CHOLEST SERPL-MCNC: 136 MG/DL
CO2 SERPL-SCNC: 20 MMOL/L
CREAT SERPL-MCNC: 1.25 MG/DL
CREAT SPEC-SCNC: 129 MG/DL
EGFRCR SERPLBLD CKD-EPI 2021: 62 ML/MIN/1.73M2
GLUCOSE SERPL-MCNC: 110 MG/DL
HDLC SERPL-MCNC: 51 MG/DL
LDLC SERPL-MCNC: 64 MG/DL
MICROALBUMIN 24H UR DL<=1MG/L-MCNC: 1.2 MG/DL
MICROALBUMIN/CREAT 24H UR-RTO: NORMAL MG/G
NONHDLC SERPL-MCNC: 84 MG/DL
POTASSIUM SERPL-SCNC: 4.5 MMOL/L
PROT SERPL-MCNC: 6.7 G/DL
SODIUM SERPL-SCNC: 141 MMOL/L
TRIGL SERPL-MCNC: 111 MG/DL

## 2025-06-03 ENCOUNTER — APPOINTMENT (OUTPATIENT)
Dept: INTERNAL MEDICINE | Facility: CLINIC | Age: 70
End: 2025-06-03
Payer: MEDICARE

## 2025-06-03 ENCOUNTER — NON-APPOINTMENT (OUTPATIENT)
Age: 70
End: 2025-06-03

## 2025-06-03 VITALS
RESPIRATION RATE: 18 BRPM | OXYGEN SATURATION: 98 % | BODY MASS INDEX: 29.16 KG/M2 | SYSTOLIC BLOOD PRESSURE: 114 MMHG | WEIGHT: 175 LBS | HEIGHT: 65 IN | DIASTOLIC BLOOD PRESSURE: 76 MMHG | TEMPERATURE: 98.3 F | HEART RATE: 74 BPM

## 2025-06-03 DIAGNOSIS — E55.9 VITAMIN D DEFICIENCY, UNSPECIFIED: ICD-10-CM

## 2025-06-03 DIAGNOSIS — E66.3 OVERWEIGHT: ICD-10-CM

## 2025-06-03 DIAGNOSIS — E11.9 TYPE 2 DIABETES MELLITUS W/OUT COMPLICATIONS: ICD-10-CM

## 2025-06-03 DIAGNOSIS — E66.9 OVERWEIGHT: ICD-10-CM

## 2025-06-03 DIAGNOSIS — I25.2 OLD MYOCARDIAL INFARCTION: ICD-10-CM

## 2025-06-03 DIAGNOSIS — Z00.00 ENCOUNTER FOR GENERAL ADULT MEDICAL EXAMINATION W/OUT ABNORMAL FINDINGS: ICD-10-CM

## 2025-06-03 DIAGNOSIS — R94.31 ABNORMAL ELECTROCARDIOGRAM [ECG] [EKG]: ICD-10-CM

## 2025-06-03 DIAGNOSIS — I10 ESSENTIAL (PRIMARY) HYPERTENSION: ICD-10-CM

## 2025-06-03 DIAGNOSIS — Z13.31 ENCOUNTER FOR SCREENING FOR DEPRESSION: ICD-10-CM

## 2025-06-03 DIAGNOSIS — H91.90 UNSPECIFIED HEARING LOSS, UNSPECIFIED EAR: ICD-10-CM

## 2025-06-03 DIAGNOSIS — Z71.89 OTHER SPECIFIED COUNSELING: ICD-10-CM

## 2025-06-03 DIAGNOSIS — E78.5 HYPERLIPIDEMIA, UNSPECIFIED: ICD-10-CM

## 2025-06-03 DIAGNOSIS — I25.10 ATHEROSCLEROTIC HEART DISEASE OF NATIVE CORONARY ARTERY W/OUT ANGINA PECTORIS: ICD-10-CM

## 2025-06-03 PROCEDURE — 99497 ADVNCD CARE PLAN 30 MIN: CPT

## 2025-06-03 PROCEDURE — G0447 BEHAVIOR COUNSEL OBESITY 15M: CPT | Mod: 59

## 2025-06-03 PROCEDURE — 99401 PREV MED CNSL INDIV APPRX 15: CPT | Mod: 59

## 2025-06-03 PROCEDURE — G0439: CPT

## 2025-06-03 PROCEDURE — G0444 DEPRESSION SCREEN ANNUAL: CPT | Mod: 59

## 2025-06-03 PROCEDURE — 93000 ELECTROCARDIOGRAM COMPLETE: CPT | Mod: 59

## 2025-06-03 RX ORDER — PROMETHAZINE HYDROCHLORIDE AND DEXTROMETHORPHAN HYDROBROMIDE ORAL SOLUTION 15; 6.25 MG/5ML; MG/5ML
6.25-15 SOLUTION ORAL
Qty: 1 | Refills: 0 | Status: ACTIVE | COMMUNITY
Start: 2025-06-03 | End: 1900-01-01

## 2025-06-09 LAB
25(OH)D3 SERPL-MCNC: 40.3 NG/ML
ALBUMIN SERPL ELPH-MCNC: 4.5 G/DL
ALP BLD-CCNC: 131 U/L
ALT SERPL-CCNC: 23 U/L
ANION GAP SERPL CALC-SCNC: 17 MMOL/L
APPEARANCE: CLEAR
AST SERPL-CCNC: 20 U/L
BASOPHILS # BLD AUTO: 0.05 K/UL
BASOPHILS NFR BLD AUTO: 0.8 %
BILIRUB SERPL-MCNC: 0.4 MG/DL
BILIRUBIN URINE: NEGATIVE
BLOOD URINE: NEGATIVE
BUN SERPL-MCNC: 21 MG/DL
CALCIUM SERPL-MCNC: 9.7 MG/DL
CHLORIDE SERPL-SCNC: 103 MMOL/L
CHOLEST SERPL-MCNC: 121 MG/DL
CO2 SERPL-SCNC: 19 MMOL/L
COLOR: YELLOW
CREAT SERPL-MCNC: 1.32 MG/DL
CREAT SPEC-SCNC: 147 MG/DL
EGFRCR SERPLBLD CKD-EPI 2021: 58 ML/MIN/1.73M2
EOSINOPHIL # BLD AUTO: 0.3 K/UL
EOSINOPHIL NFR BLD AUTO: 4.9 %
ESTIMATED AVERAGE GLUCOSE: 154 MG/DL
GGT SERPL-CCNC: 19 U/L
GLUCOSE QUALITATIVE U: 100 MG/DL
GLUCOSE SERPL-MCNC: 147 MG/DL
HBA1C MFR BLD HPLC: 7 %
HBV SURFACE AB SER QL: NONREACTIVE
HCT VFR BLD CALC: 40.6 %
HCV AB SER QL: NONREACTIVE
HCV S/CO RATIO: 0.09 S/CO
HDLC SERPL-MCNC: 45 MG/DL
HGB BLD-MCNC: 12.8 G/DL
IMM GRANULOCYTES NFR BLD AUTO: 0.2 %
KETONES URINE: NEGATIVE MG/DL
LDLC SERPL-MCNC: 53 MG/DL
LEUKOCYTE ESTERASE URINE: NEGATIVE
LYMPHOCYTES # BLD AUTO: 1.52 K/UL
LYMPHOCYTES NFR BLD AUTO: 24.8 %
MAN DIFF?: NORMAL
MCHC RBC-ENTMCNC: 29.3 PG
MCHC RBC-ENTMCNC: 31.5 G/DL
MCV RBC AUTO: 92.9 FL
MICROALBUMIN 24H UR DL<=1MG/L-MCNC: 1.2 MG/DL
MICROALBUMIN/CREAT 24H UR-RTO: NORMAL MG/G
MONOCYTES # BLD AUTO: 0.45 K/UL
MONOCYTES NFR BLD AUTO: 7.4 %
NEUTROPHILS # BLD AUTO: 3.79 K/UL
NEUTROPHILS NFR BLD AUTO: 61.9 %
NITRITE URINE: NEGATIVE
NONHDLC SERPL-MCNC: 75 MG/DL
PH URINE: 5.5
PLATELET # BLD AUTO: 263 K/UL
POTASSIUM SERPL-SCNC: 4.6 MMOL/L
PROT SERPL-MCNC: 6.7 G/DL
PROTEIN URINE: NORMAL MG/DL
PSA SERPL-MCNC: 0.54 NG/ML
RBC # BLD: 4.37 M/UL
RBC # FLD: 13.2 %
SODIUM SERPL-SCNC: 139 MMOL/L
SPECIFIC GRAVITY URINE: 1.02
TRIGL SERPL-MCNC: 129 MG/DL
TSH SERPL-ACNC: 2.08 UIU/ML
UROBILINOGEN URINE: 0.2 MG/DL
VIT B12 SERPL-MCNC: 373 PG/ML
WBC # FLD AUTO: 6.12 K/UL

## 2025-07-29 ENCOUNTER — APPOINTMENT (OUTPATIENT)
Dept: INTERNAL MEDICINE | Facility: CLINIC | Age: 70
End: 2025-07-29
Payer: MEDICARE

## 2025-07-29 VITALS
TEMPERATURE: 98.7 F | OXYGEN SATURATION: 97 % | SYSTOLIC BLOOD PRESSURE: 92 MMHG | HEART RATE: 70 BPM | HEIGHT: 65 IN | BODY MASS INDEX: 29.16 KG/M2 | WEIGHT: 175 LBS | DIASTOLIC BLOOD PRESSURE: 46 MMHG | RESPIRATION RATE: 18 BRPM

## 2025-07-29 DIAGNOSIS — E66.3 OVERWEIGHT: ICD-10-CM

## 2025-07-29 DIAGNOSIS — E11.9 TYPE 2 DIABETES MELLITUS W/OUT COMPLICATIONS: ICD-10-CM

## 2025-07-29 DIAGNOSIS — E66.9 OVERWEIGHT: ICD-10-CM

## 2025-07-29 DIAGNOSIS — T46.5X5A OTHER FATIGUE: ICD-10-CM

## 2025-07-29 DIAGNOSIS — N18.31 CHRONIC KIDNEY DISEASE, STAGE 3A: ICD-10-CM

## 2025-07-29 DIAGNOSIS — E78.5 HYPERLIPIDEMIA, UNSPECIFIED: ICD-10-CM

## 2025-07-29 DIAGNOSIS — N40.0 BENIGN PROSTATIC HYPERPLASIA WITHOUT LOWER URINARY TRACT SYMPMS: ICD-10-CM

## 2025-07-29 DIAGNOSIS — I25.2 OLD MYOCARDIAL INFARCTION: ICD-10-CM

## 2025-07-29 DIAGNOSIS — R53.83 OTHER FATIGUE: ICD-10-CM

## 2025-07-29 DIAGNOSIS — I10 ESSENTIAL (PRIMARY) HYPERTENSION: ICD-10-CM

## 2025-07-29 PROCEDURE — 99214 OFFICE O/P EST MOD 30 MIN: CPT | Mod: 25

## 2025-08-03 LAB
ALBUMIN SERPL ELPH-MCNC: 4.4 G/DL
ALBUMIN, RANDOM URINE: <1.2 MG/DL
ALP BLD-CCNC: 154 U/L
ALT SERPL-CCNC: 29 U/L
ANION GAP SERPL CALC-SCNC: 11 MMOL/L
AST SERPL-CCNC: 24 U/L
BILIRUB SERPL-MCNC: 0.3 MG/DL
BUN SERPL-MCNC: 23 MG/DL
CALCIUM SERPL-MCNC: 10 MG/DL
CHLORIDE SERPL-SCNC: 108 MMOL/L
CO2 SERPL-SCNC: 20 MMOL/L
CREAT SERPL-MCNC: 1.36 MG/DL
CREAT SPEC-SCNC: 220 MG/DL
EGFRCR SERPLBLD CKD-EPI 2021: 56 ML/MIN/1.73M2
GLUCOSE SERPL-MCNC: 137 MG/DL
MICROALBUMIN/CREAT 24H UR-RTO: NORMAL MG/G
POTASSIUM SERPL-SCNC: 5 MMOL/L
PROT SERPL-MCNC: 6.7 G/DL
SODIUM SERPL-SCNC: 139 MMOL/L

## 2025-09-16 ENCOUNTER — RX RENEWAL (OUTPATIENT)
Age: 70
End: 2025-09-16